# Patient Record
Sex: FEMALE | Race: WHITE | NOT HISPANIC OR LATINO
[De-identification: names, ages, dates, MRNs, and addresses within clinical notes are randomized per-mention and may not be internally consistent; named-entity substitution may affect disease eponyms.]

---

## 2017-11-20 PROBLEM — Z00.00 ENCOUNTER FOR PREVENTIVE HEALTH EXAMINATION: Status: ACTIVE | Noted: 2017-11-20

## 2017-12-01 ENCOUNTER — APPOINTMENT (OUTPATIENT)
Dept: OBGYN | Facility: CLINIC | Age: 42
End: 2017-12-01
Payer: COMMERCIAL

## 2017-12-01 VITALS
SYSTOLIC BLOOD PRESSURE: 132 MMHG | BODY MASS INDEX: 17.19 KG/M2 | WEIGHT: 107 LBS | DIASTOLIC BLOOD PRESSURE: 63 MMHG | HEIGHT: 66 IN | HEART RATE: 84 BPM

## 2017-12-01 DIAGNOSIS — Z80.3 FAMILY HISTORY OF MALIGNANT NEOPLASM OF BREAST: ICD-10-CM

## 2017-12-01 DIAGNOSIS — D47.3 ESSENTIAL (HEMORRHAGIC) THROMBOCYTHEMIA: ICD-10-CM

## 2017-12-01 DIAGNOSIS — Z80.41 FAMILY HISTORY OF MALIGNANT NEOPLASM OF OVARY: ICD-10-CM

## 2017-12-01 DIAGNOSIS — N84.0 POLYP OF CORPUS UTERI: ICD-10-CM

## 2017-12-01 DIAGNOSIS — F41.9 ANXIETY DISORDER, UNSPECIFIED: ICD-10-CM

## 2017-12-01 DIAGNOSIS — Z80.0 FAMILY HISTORY OF MALIGNANT NEOPLASM OF DIGESTIVE ORGANS: ICD-10-CM

## 2017-12-01 DIAGNOSIS — Z82.49 FAMILY HISTORY OF ISCHEMIC HEART DISEASE AND OTHER DISEASES OF THE CIRCULATORY SYSTEM: ICD-10-CM

## 2017-12-01 DIAGNOSIS — Z78.9 OTHER SPECIFIED HEALTH STATUS: ICD-10-CM

## 2017-12-01 PROCEDURE — 76857 US EXAM PELVIC LIMITED: CPT

## 2017-12-01 PROCEDURE — 99214 OFFICE O/P EST MOD 30 MIN: CPT

## 2017-12-02 LAB
BLOOD URINE: NORMAL
GLUCOSE QUALITATIVE U: NORMAL
LEUKOCYTE ESTERASE URINE: NORMAL
NITRITE URINE: NORMAL
PROTEIN URINE: NORMAL

## 2017-12-29 ENCOUNTER — APPOINTMENT (OUTPATIENT)
Dept: OBGYN | Facility: CLINIC | Age: 42
End: 2017-12-29
Payer: COMMERCIAL

## 2017-12-29 VITALS
WEIGHT: 107 LBS | HEIGHT: 66 IN | DIASTOLIC BLOOD PRESSURE: 59 MMHG | SYSTOLIC BLOOD PRESSURE: 121 MMHG | BODY MASS INDEX: 17.19 KG/M2 | HEART RATE: 94 BPM

## 2017-12-29 DIAGNOSIS — N63.20 UNSPECIFIED LUMP IN THE LEFT BREAST, UNSPECIFIED QUADRANT: ICD-10-CM

## 2017-12-29 PROCEDURE — 99214 OFFICE O/P EST MOD 30 MIN: CPT

## 2018-01-22 ENCOUNTER — APPOINTMENT (OUTPATIENT)
Dept: OBGYN | Facility: CLINIC | Age: 43
End: 2018-01-22
Payer: COMMERCIAL

## 2018-01-22 VITALS
WEIGHT: 107 LBS | DIASTOLIC BLOOD PRESSURE: 74 MMHG | BODY MASS INDEX: 17.19 KG/M2 | HEART RATE: 88 BPM | SYSTOLIC BLOOD PRESSURE: 129 MMHG | HEIGHT: 66 IN

## 2018-01-22 DIAGNOSIS — L29.0 PRURITUS ANI: ICD-10-CM

## 2018-01-22 LAB
BLOOD URINE: NORMAL
GLUCOSE QUALITATIVE U: NORMAL
KETONES URINE: NORMAL
LEUKOCYTE ESTERASE URINE: NORMAL
NITRITE URINE: NORMAL
PROTEIN URINE: NORMAL

## 2018-01-22 PROCEDURE — 99214 OFFICE O/P EST MOD 30 MIN: CPT

## 2018-01-23 ENCOUNTER — RX RENEWAL (OUTPATIENT)
Age: 43
End: 2018-01-23

## 2018-01-23 LAB
CANDIDA VAG CYTO: NOT DETECTED
FSH SERPL-MCNC: 2.1 IU/L
G VAGINALIS+PREV SP MTYP VAG QL MICRO: DETECTED
LH SERPL-ACNC: 3.8 IU/L
PROLACTIN SERPL-MCNC: 16 NG/ML
T VAGINALIS VAG QL WET PREP: NOT DETECTED
TSH SERPL-ACNC: 1.7 UIU/ML

## 2018-01-29 LAB — BACTERIA GENITAL AEROBE CULT: NORMAL

## 2018-02-16 ENCOUNTER — MESSAGE (OUTPATIENT)
Age: 43
End: 2018-02-16

## 2018-05-18 ENCOUNTER — APPOINTMENT (OUTPATIENT)
Dept: OBGYN | Facility: CLINIC | Age: 43
End: 2018-05-18
Payer: COMMERCIAL

## 2018-05-18 VITALS
HEART RATE: 80 BPM | DIASTOLIC BLOOD PRESSURE: 83 MMHG | BODY MASS INDEX: 17.52 KG/M2 | HEIGHT: 66 IN | SYSTOLIC BLOOD PRESSURE: 139 MMHG | WEIGHT: 109 LBS

## 2018-05-18 LAB
BILIRUBIN URINE: NORMAL
BILIRUBIN: NORMAL
GLUCOSE QUALITATIVE U: NORMAL
NITRITE, URINE: NORMAL
PH URINE: 5.5
PROTEIN URINE: NORMAL
SPECIFIC GRAVITY URINE: 1.01

## 2018-05-18 PROCEDURE — 99396 PREV VISIT EST AGE 40-64: CPT

## 2018-05-18 RX ORDER — ESCITALOPRAM OXALATE 5 MG/1
5 TABLET ORAL
Qty: 30 | Refills: 0 | Status: COMPLETED | COMMUNITY
Start: 2017-07-14

## 2018-05-18 RX ORDER — AZITHROMYCIN 250 MG/1
250 TABLET, FILM COATED ORAL
Qty: 12 | Refills: 0 | Status: COMPLETED | COMMUNITY
Start: 2017-10-12

## 2018-05-18 RX ORDER — DEXLANSOPRAZOLE 60 MG/1
60 CAPSULE, DELAYED RELEASE ORAL
Qty: 30 | Refills: 0 | Status: COMPLETED | COMMUNITY
Start: 2016-12-03

## 2018-05-18 RX ORDER — ASPIRIN 325 MG/1
TABLET, FILM COATED ORAL
Refills: 0 | Status: COMPLETED | COMMUNITY
End: 2018-05-18

## 2018-05-18 RX ORDER — HYDROXYUREA 500 MG/1
500 CAPSULE ORAL
Qty: 60 | Refills: 0 | Status: COMPLETED | COMMUNITY
Start: 2018-04-19

## 2018-05-18 RX ORDER — DEXLANSOPRAZOLE 30 MG/1
30 CAPSULE, DELAYED RELEASE ORAL
Refills: 0 | Status: COMPLETED | COMMUNITY
End: 2018-05-18

## 2018-05-18 RX ORDER — PROCHLORPERAZINE MALEATE 10 MG/1
10 TABLET ORAL
Qty: 20 | Refills: 0 | Status: COMPLETED | COMMUNITY
Start: 2018-03-09

## 2018-05-18 RX ORDER — ALPRAZOLAM 0.25 MG/1
0.25 TABLET ORAL
Qty: 30 | Refills: 0 | Status: COMPLETED | COMMUNITY
Start: 2017-11-27

## 2018-05-18 RX ORDER — ESCITALOPRAM OXALATE 10 MG/1
10 TABLET, FILM COATED ORAL
Refills: 0 | Status: COMPLETED | COMMUNITY
End: 2018-05-18

## 2018-05-18 RX ORDER — HYDROXYUREA 500 MG/1
CAPSULE ORAL
Refills: 0 | Status: COMPLETED | COMMUNITY
End: 2018-05-18

## 2018-05-18 RX ORDER — PREDNISONE 20 MG/1
20 TABLET ORAL
Qty: 12 | Refills: 0 | Status: COMPLETED | COMMUNITY
Start: 2017-10-12

## 2018-05-18 RX ORDER — CLINDAMYCIN PHOSPHATE 20 MG/G
2 CREAM VAGINAL
Qty: 3 | Refills: 1 | Status: COMPLETED | COMMUNITY
Start: 2018-01-23 | End: 2018-05-18

## 2018-05-19 LAB — HPV HIGH+LOW RISK DNA PNL CVX: NOT DETECTED

## 2018-05-23 LAB — CYTOLOGY CVX/VAG DOC THIN PREP: NORMAL

## 2018-07-13 ENCOUNTER — APPOINTMENT (OUTPATIENT)
Dept: OBGYN | Facility: CLINIC | Age: 43
End: 2018-07-13
Payer: COMMERCIAL

## 2018-07-13 VITALS
SYSTOLIC BLOOD PRESSURE: 124 MMHG | HEIGHT: 66 IN | HEART RATE: 75 BPM | BODY MASS INDEX: 17.52 KG/M2 | WEIGHT: 109 LBS | DIASTOLIC BLOOD PRESSURE: 70 MMHG

## 2018-07-13 PROCEDURE — 76856 US EXAM PELVIC COMPLETE: CPT

## 2018-07-13 PROCEDURE — 99214 OFFICE O/P EST MOD 30 MIN: CPT

## 2018-07-16 LAB
BACTERIA GENITAL AEROBE CULT: NORMAL
BLOOD URINE: NORMAL
CANDIDA VAG CYTO: NOT DETECTED
G VAGINALIS+PREV SP MTYP VAG QL MICRO: NOT DETECTED
GLUCOSE QUALITATIVE U: NORMAL
KETONES URINE: NORMAL
LEUKOCYTE ESTERASE URINE: NORMAL
NITRITE URINE: NORMAL
PROTEIN URINE: NORMAL
T VAGINALIS VAG QL WET PREP: NOT DETECTED

## 2018-07-27 ENCOUNTER — CLINICAL ADVICE (OUTPATIENT)
Age: 43
End: 2018-07-27

## 2018-07-27 PROBLEM — Z80.0 FAMILY HISTORY OF COLON CANCER: Status: ACTIVE | Noted: 2017-12-01

## 2018-12-14 ENCOUNTER — APPOINTMENT (OUTPATIENT)
Dept: OBGYN | Facility: CLINIC | Age: 43
End: 2018-12-14
Payer: COMMERCIAL

## 2018-12-14 VITALS
SYSTOLIC BLOOD PRESSURE: 120 MMHG | WEIGHT: 109 LBS | HEIGHT: 66 IN | HEART RATE: 71 BPM | BODY MASS INDEX: 17.52 KG/M2 | DIASTOLIC BLOOD PRESSURE: 80 MMHG

## 2018-12-14 DIAGNOSIS — N92.0 EXCESSIVE AND FREQUENT MENSTRUATION WITH REGULAR CYCLE: ICD-10-CM

## 2018-12-14 PROCEDURE — 76856 US EXAM PELVIC COMPLETE: CPT

## 2018-12-14 PROCEDURE — 99214 OFFICE O/P EST MOD 30 MIN: CPT

## 2019-05-24 ENCOUNTER — APPOINTMENT (OUTPATIENT)
Dept: OBGYN | Facility: CLINIC | Age: 44
End: 2019-05-24
Payer: COMMERCIAL

## 2019-05-24 VITALS
HEIGHT: 66 IN | BODY MASS INDEX: 17.52 KG/M2 | SYSTOLIC BLOOD PRESSURE: 120 MMHG | WEIGHT: 109 LBS | DIASTOLIC BLOOD PRESSURE: 90 MMHG | HEART RATE: 74 BPM

## 2019-05-24 PROCEDURE — 99214 OFFICE O/P EST MOD 30 MIN: CPT

## 2019-05-24 RX ORDER — TOBRAMYCIN AND DEXAMETHASONE 3; 1 MG/ML; MG/ML
0.3-0.1 SUSPENSION/ DROPS OPHTHALMIC
Qty: 5 | Refills: 0 | Status: COMPLETED | COMMUNITY
Start: 2018-12-24

## 2019-05-24 NOTE — HISTORY OF PRESENT ILLNESS
[Fever] : no fever [Nausea] : no nausea [Diarrhea] : no diarrhea [Vomiting] : no vomiting [Pelvic Pressure] : no pelvic pressure [Vaginal Bleeding] : no vaginal bleeding [Dysuria] : no dysuria [Burning] : burning [Itching] : itching [Stinging] : no stinging [Mass] : no mass [Lesion] : no lesion [Soreness] : no soreness [Discharge] : no discharge [Definite:  ___ (Date)] : the last menstrual period was [unfilled] [Normal Amount/Duration] : was of a normal amount and duration [Regular Cycle Intervals] : periods have been regular [Spotting Between  Menses] : no spotting between menses [Menstrual Cramps] : no menstrual cramps [Currently In Menopause] : not currently in menopause [On BCP at conception] : the patient was not on BCP at conception [Experiencing Menopausal Sxs] : not experiencing menopausal symptoms [Sexually Active] : is sexually active [Monogamous] : is monogamous [Male ___] : [unfilled] male [Contraception] : does not use contraception [NA] : N/A

## 2019-05-28 LAB
BACTERIA GENITAL AEROBE CULT: NORMAL
CANDIDA VAG CYTO: NOT DETECTED
G VAGINALIS+PREV SP MTYP VAG QL MICRO: NOT DETECTED
T VAGINALIS VAG QL WET PREP: NOT DETECTED

## 2019-06-28 ENCOUNTER — ASOB RESULT (OUTPATIENT)
Age: 44
End: 2019-06-28

## 2019-06-28 ENCOUNTER — APPOINTMENT (OUTPATIENT)
Dept: ANTEPARTUM | Facility: CLINIC | Age: 44
End: 2019-06-28
Payer: COMMERCIAL

## 2019-06-28 ENCOUNTER — APPOINTMENT (OUTPATIENT)
Dept: OBGYN | Facility: CLINIC | Age: 44
End: 2019-06-28
Payer: COMMERCIAL

## 2019-06-28 VITALS
HEART RATE: 71 BPM | DIASTOLIC BLOOD PRESSURE: 90 MMHG | WEIGHT: 110 LBS | SYSTOLIC BLOOD PRESSURE: 104 MMHG | HEIGHT: 66 IN | BODY MASS INDEX: 17.68 KG/M2

## 2019-06-28 DIAGNOSIS — E28.2 POLYCYSTIC OVARIAN SYNDROME: ICD-10-CM

## 2019-06-28 PROCEDURE — 76856 US EXAM PELVIC COMPLETE: CPT | Mod: 59

## 2019-06-28 PROCEDURE — 99396 PREV VISIT EST AGE 40-64: CPT

## 2019-06-28 PROCEDURE — 76830 TRANSVAGINAL US NON-OB: CPT

## 2019-06-28 RX ORDER — ASPIRIN 81 MG
81 TABLET, DELAYED RELEASE (ENTERIC COATED) ORAL
Refills: 0 | Status: ACTIVE | COMMUNITY

## 2019-06-28 RX ORDER — HYDROXYUREA 500 MG/1
CAPSULE ORAL
Refills: 0 | Status: ACTIVE | COMMUNITY

## 2019-06-28 RX ORDER — ESCITALOPRAM OXALATE 5 MG/1
TABLET, FILM COATED ORAL DAILY
Refills: 0 | Status: ACTIVE | COMMUNITY

## 2019-06-28 NOTE — PHYSICAL EXAM
[Awake] : awake [Alert] : alert [Examination Of The Breasts] : a normal appearance [No Masses] : no breast masses were palpable [Soft] : soft [Oriented x3] : oriented to person, place, and time [Normal] : uterus [No Bleeding] : there was no active vaginal bleeding [Uterine Adnexae] : were not tender and not enlarged [Acute Distress] : no acute distress [Mass] : no breast mass [Nipple Discharge] : no nipple discharge [Axillary LAD] : no axillary lymphadenopathy [Tender] : non tender

## 2019-06-28 NOTE — HISTORY OF PRESENT ILLNESS
[1 Year Ago] : 1 year ago [Good] : being in good health [Healthy Diet] : a healthy diet [Regular Exercise] : regular exercise [Current] : risk screening reviewed and current [Reproductive Age] : is of reproductive age [Pregnancy History] : pregnancy history: [HPV Vaccine NA Due to Age] : HPV vaccine not available to patient due to age [Prolonged Menses] : prolonged menses [Moderate Bleeding] : described as moderate in severity [Definite:  ___ (Date)] : the last menstrual period was [unfilled] [Regular Cycle Intervals] : periods have been regular [Sexually Active] : is sexually active [Monogamous] : is monogamous [Male ___] : [unfilled] male [NA] : N/A [Weight Concerns] : no concerns with her weight [Menstrual Problems] : reports normal menses [Up to Date] : not up to date with ~his/her~ STD screening [Fever] : no fever [Nausea] : no nausea [Vomiting] : no vomiting [Diarrhea] : no diarrhea [Vaginal Bleeding] : no vaginal bleeding [Pelvic Pressure] : no pelvic pressure [Dysuria] : no dysuria [Irregular Menses] : normal menses [FreeTextEntry9] : none [Hot Flashes] : no hot flashes [Night Sweats] : no night sweats [Vaginal Itching] : no vaginal itching [Dyspareunia] : no dyspareunia [Normal Amount/Duration] : was abnormal [Spotting Between  Menses] : no spotting between menses [Menstrual Cramps] : no menstrual cramps [Currently In Menopause] : not currently in menopause [Experiencing Menopausal Sxs] : not experiencing menopausal symptoms [Contraception] : does not use contraception

## 2019-07-01 LAB
FSH SERPL-MCNC: 8.7 IU/L
HPV HIGH+LOW RISK DNA PNL CVX: NOT DETECTED
LH SERPL-ACNC: 8.7 IU/L
PROLACTIN SERPL-MCNC: 7.7 NG/ML
TSH SERPL-ACNC: 1.52 UIU/ML

## 2019-07-05 LAB — CYTOLOGY CVX/VAG DOC THIN PREP: NORMAL

## 2020-01-10 ENCOUNTER — MESSAGE (OUTPATIENT)
Age: 45
End: 2020-01-10

## 2020-01-17 ENCOUNTER — APPOINTMENT (OUTPATIENT)
Dept: OBGYN | Facility: CLINIC | Age: 45
End: 2020-01-17

## 2020-01-17 ENCOUNTER — APPOINTMENT (OUTPATIENT)
Dept: ANTEPARTUM | Facility: CLINIC | Age: 45
End: 2020-01-17

## 2020-05-01 ENCOUNTER — APPOINTMENT (OUTPATIENT)
Dept: ANTEPARTUM | Facility: CLINIC | Age: 45
End: 2020-05-01

## 2020-05-01 ENCOUNTER — APPOINTMENT (OUTPATIENT)
Dept: OBGYN | Facility: CLINIC | Age: 45
End: 2020-05-01
Payer: COMMERCIAL

## 2020-05-01 VITALS — DIASTOLIC BLOOD PRESSURE: 80 MMHG | SYSTOLIC BLOOD PRESSURE: 120 MMHG | HEIGHT: 66 IN | HEART RATE: 104 BPM

## 2020-05-01 DIAGNOSIS — U07.1 COVID-19: ICD-10-CM

## 2020-05-01 PROCEDURE — 76856 US EXAM PELVIC COMPLETE: CPT

## 2020-05-01 PROCEDURE — 99214 OFFICE O/P EST MOD 30 MIN: CPT

## 2020-05-01 RX ORDER — CLINDAMYCIN PHOSPHATE 20 MG/G
2 CREAM VAGINAL
Qty: 40 | Refills: 1 | Status: DISCONTINUED | COMMUNITY
Start: 2020-04-13 | End: 2020-05-01

## 2020-05-01 RX ORDER — MOMETASONE FUROATE 1 MG/G
0.1 CREAM TOPICAL TWICE DAILY
Qty: 2 | Refills: 1 | Status: DISCONTINUED | COMMUNITY
Start: 2019-05-24 | End: 2020-05-01

## 2020-05-01 NOTE — PROCEDURE
[Abnormal Uterine Bleeding] : abnormal uterine bleeding [Pelvic Pain] : pelvic pain [Transvaginal Ultrasound] : transvaginal ultrasound [Present] : uterus present [No Fibroid(s)] : no fibroid(s) [L: ___ cm] : L: [unfilled] cm [H: ___ cm] : H: [unfilled] cm [W: ___cm] : W: [unfilled] cm [FreeTextEntry5] : EM 8,5 mm.  An echogenic area is noted at the fundus.  ? IUM [FreeTextEntry7] : 3.4 X 2.0 X 2.8 [FreeTextEntry8] : 2.9 X 1.0 [FreeTextEntry6] : No free fluid. [FreeTextEntry4] : Possible intrauterine mass.

## 2020-05-01 NOTE — HISTORY OF PRESENT ILLNESS
[Lower-Rt-Q] : lower right quadrant [Burning] : no burning [Dull] : dull [Continuous] : continuous [Sharp] : no sharp [Irregular Menses] : normal menses [Stabbing] : no stabbing [Moderate Bleeding] : described as moderate in severity [Prolonged Menses] : prolonged menses [Definite:  ___ (Date)] : the last menstrual period was [unfilled] [Normal Amount/Duration] : was abnormal [Spotting Between  Menses] : no spotting between menses [Currently In Menopause] : not currently in menopause [Regular Cycle Intervals] : periods have been regular [Menstrual Cramps] : no menstrual cramps [Experiencing Menopausal Sxs] : not experiencing menopausal symptoms [Sexually Active] : is sexually active [Monogamous] : is monogamous [Male ___] : [unfilled] male [Contraception] : does not use contraception [NA] : N/A

## 2020-05-02 LAB
FSH SERPL-MCNC: 6.9 IU/L
LH SERPL-ACNC: 12.5 IU/L
PROLACTIN SERPL-MCNC: 9.7 NG/ML
TSH SERPL-ACNC: 1 UIU/ML

## 2020-05-26 ENCOUNTER — APPOINTMENT (OUTPATIENT)
Dept: MATERNAL FETAL MEDICINE | Facility: CLINIC | Age: 45
End: 2020-05-26

## 2020-09-28 ENCOUNTER — APPOINTMENT (OUTPATIENT)
Dept: OBGYN | Facility: CLINIC | Age: 45
End: 2020-09-28

## 2020-09-28 ENCOUNTER — ASOB RESULT (OUTPATIENT)
Age: 45
End: 2020-09-28

## 2020-09-28 ENCOUNTER — APPOINTMENT (OUTPATIENT)
Dept: ANTEPARTUM | Facility: CLINIC | Age: 45
End: 2020-09-28
Payer: COMMERCIAL

## 2020-09-28 PROCEDURE — 58340 CATHETER FOR HYSTEROGRAPHY: CPT

## 2020-09-28 PROCEDURE — 76831 ECHO EXAM UTERUS: CPT

## 2020-10-02 LAB
FSH SERPL-MCNC: 3.5 IU/L
GLUCOSE BS SERPL-MCNC: 78 MG/DL
INSULIN SERPL-MCNC: 4 UU/ML
LH SERPL-ACNC: 5.3 IU/L
PROLACTIN SERPL-MCNC: 14.4 NG/ML
TSH SERPL-ACNC: 1.45 UIU/ML

## 2020-10-09 ENCOUNTER — APPOINTMENT (OUTPATIENT)
Dept: ANTEPARTUM | Facility: CLINIC | Age: 45
End: 2020-10-09

## 2020-10-09 ENCOUNTER — APPOINTMENT (OUTPATIENT)
Dept: OBGYN | Facility: CLINIC | Age: 45
End: 2020-10-09
Payer: COMMERCIAL

## 2020-10-09 VITALS
HEIGHT: 66 IN | WEIGHT: 108 LBS | DIASTOLIC BLOOD PRESSURE: 84 MMHG | SYSTOLIC BLOOD PRESSURE: 110 MMHG | BODY MASS INDEX: 17.36 KG/M2 | HEART RATE: 87 BPM

## 2020-10-09 DIAGNOSIS — Z20.9 CONTACT WITH AND (SUSPECTED) EXPOSURE TO UNSPECIFIED COMMUNICABLE DISEASE: ICD-10-CM

## 2020-10-09 PROCEDURE — 99396 PREV VISIT EST AGE 40-64: CPT

## 2020-10-09 NOTE — DISCUSSION/SUMMARY
[FreeTextEntry1] : routine check up.\par PAP.\par Mammo\par STD cultures and blood work.\par Await MRI results.\par \par Follow up in one year.

## 2020-10-09 NOTE — HISTORY OF PRESENT ILLNESS
[Patient reported mammogram was normal] : Patient reported mammogram was normal [Patient reported PAP Smear was normal] : Patient reported PAP Smear was normal [HIV test declined] : HIV test declined [Syphilis test declined] : Syphilis test declined [Gonorrhea test declined] : Gonorrhea test declined [Chlamydia test declined] : Chlamydia test declined [Trichomonas test declined] : Trichomonas test declined [HPV test declined] : HPV test declined [Hepatitis B test declined] : Hepatitis B test declined [Hepatitis C test declined] : Hepatitis C test declined [Irregular Menstrual Interval] : irregular menstrual interval [Abnormal Quantity] : abnormal quantity [Moderate Bleeding] : moderate bleeding [Pain] : pain [Normal Amount/Duration] :  normal amount and duration [Spotting Between  Menses] : spotting reported between menses [Regular Cycle Intervals] : periods have been regular [Menstrual Cramps] : menstrual cramps [Previously active] : previously active [Men] : men [Vaginal] : vaginal [No] : No [Patient refuses STI testing] : Patient refuses STI testing [Mammogramdate] : 11/19 [PapSmeardate] : 06/19 [TextBox_7] : none [TextBox_4] : none [TextBox_17] : none [FreeTextEntry1] : 10/01/20

## 2020-10-09 NOTE — REASON FOR VISIT
[Annual] : an annual visit. [Menorrhagia] : menorrhagia [Abnormal Uterine Bleeding] : abnormal uterine bleeding

## 2020-10-09 NOTE — PHYSICAL EXAM
[Appropriately responsive] : appropriately responsive [Alert] : alert [No Acute Distress] : no acute distress [No Lymphadenopathy] : no lymphadenopathy [Regular Rate Rhythm] : regular rate rhythm [No Murmurs] : no murmurs [Clear to Auscultation B/L] : clear to auscultation bilaterally [Soft] : soft [Non-tender] : non-tender [Non-distended] : non-distended [No HSM] : No HSM [No Lesions] : no lesions [No Mass] : no mass [Oriented x3] : oriented x3 [Examination Of The Breasts] : a normal appearance [No Masses] : no breast masses were palpable [Labia Majora] : normal [Labia Minora] : normal [Normal] : normal [Uterine Adnexae] : normal [FreeTextEntry5] : possible polyp.  stained with PAP.

## 2020-10-10 LAB
HBV SURFACE AB SER QL: REACTIVE
HBV SURFACE AG SER QL: NONREACTIVE
HCV AB SER QL: NONREACTIVE
HCV S/CO RATIO: 0.12 S/CO
HIV1+2 AB SPEC QL IA.RAPID: NONREACTIVE

## 2020-10-12 LAB
BACTERIA GENITAL AEROBE CULT: NORMAL
C TRACH RRNA SPEC QL NAA+PROBE: NOT DETECTED
CANDIDA VAG CYTO: NOT DETECTED
G VAGINALIS+PREV SP MTYP VAG QL MICRO: DETECTED
N GONORRHOEA RRNA SPEC QL NAA+PROBE: NOT DETECTED
RPR SER-TITR: NORMAL
SOURCE TP AMPLIFICATION: NORMAL
T VAGINALIS VAG QL WET PREP: NOT DETECTED

## 2020-10-14 LAB — HPV HIGH+LOW RISK DNA PNL CVX: NOT DETECTED

## 2020-10-15 LAB
HSV1 IGM SER QL: NORMAL TITER
HSV2 AB FLD-ACNC: NORMAL TITER

## 2020-10-16 LAB — CYTOLOGY CVX/VAG DOC THIN PREP: NORMAL

## 2020-10-23 ENCOUNTER — APPOINTMENT (OUTPATIENT)
Dept: ANTEPARTUM | Facility: CLINIC | Age: 45
End: 2020-10-23

## 2020-10-23 ENCOUNTER — LABORATORY RESULT (OUTPATIENT)
Age: 45
End: 2020-10-23

## 2020-10-23 ENCOUNTER — APPOINTMENT (OUTPATIENT)
Dept: OBGYN | Facility: CLINIC | Age: 45
End: 2020-10-23
Payer: COMMERCIAL

## 2020-10-23 VITALS — OXYGEN SATURATION: 98 % | HEART RATE: 93 BPM | RESPIRATION RATE: 16 BRPM

## 2020-10-23 LAB
BILIRUB UR QL STRIP: NORMAL
GLUCOSE UR-MCNC: NORMAL
HGB UR QL STRIP.AUTO: NORMAL
KETONES UR-MCNC: NORMAL
LEUKOCYTE ESTERASE UR QL STRIP: NORMAL
NITRITE UR QL STRIP: NORMAL
PH UR STRIP: 6.5
PROT UR STRIP-MCNC: NORMAL
SP GR UR STRIP: 1.02

## 2020-10-23 PROCEDURE — 99214 OFFICE O/P EST MOD 30 MIN: CPT

## 2020-10-23 PROCEDURE — 99072 ADDL SUPL MATRL&STAF TM PHE: CPT

## 2020-10-23 NOTE — HISTORY OF PRESENT ILLNESS
[Suprapubic] : suprapubic area [Continuous] : continuous [Dull] : dull [Sharp] : no sharp [Stabbing] : no stabbing [Throbbing] : no throbbing [Fever] : no fever [Nausea] : no nausea [Vomiting] : no vomiting [Burning] : burning [Itching] : no itching [Mass] : no mass [Stinging] : no stinging [Lesion] : no lesion [Soreness] : no soreness [Discharge] : discharge [Definite:  ___ (Date)] : the last menstrual period was [unfilled] [Normal Amount/Duration] : was abnormal [Spotting Between  Menses] : no spotting between menses [Regular Cycle Intervals] : periods have been regular [Menstrual Cramps] : no menstrual cramps [Currently In Menopause] : not currently in menopause [Experiencing Menopausal Sxs] : not experiencing menopausal symptoms [Sexually Active] : is not sexually active [Monogamous] : is monogamous [Contraception] : does not use contraception [Male ___] : [unfilled] male [NA] : N/A

## 2020-10-23 NOTE — PHYSICAL EXAM
[Normal] : uterus [Labia Majora] : labia major [Labia Minora] : labia minora [Erythema] : erythema [Discharge] : a  ~M vaginal discharge was present [Dry Mucosa] : moist mucosa [Erosion] : had erosions [Polyp ___ cm] : had no polyp [Dilated] : the cervix was not dilated [Soft] :  the cervix was not soft [Uterine Adnexae] : was normal on the right [___] : a [unfilled] ~Ucm mass was palpated in the left adnexa [No Tenderness] : no rectal tenderness

## 2020-10-26 LAB
BACTERIA UR CULT: NORMAL
CANDIDA VAG CYTO: NOT DETECTED
G VAGINALIS+PREV SP MTYP VAG QL MICRO: NOT DETECTED
T VAGINALIS VAG QL WET PREP: NOT DETECTED

## 2020-12-14 ENCOUNTER — APPOINTMENT (OUTPATIENT)
Dept: ANTEPARTUM | Facility: CLINIC | Age: 45
End: 2020-12-14

## 2020-12-14 ENCOUNTER — APPOINTMENT (OUTPATIENT)
Dept: OBGYN | Facility: CLINIC | Age: 45
End: 2020-12-14
Payer: COMMERCIAL

## 2020-12-14 VITALS — SYSTOLIC BLOOD PRESSURE: 110 MMHG | HEART RATE: 74 BPM | DIASTOLIC BLOOD PRESSURE: 80 MMHG | HEIGHT: 66 IN

## 2020-12-14 PROCEDURE — 99072 ADDL SUPL MATRL&STAF TM PHE: CPT

## 2020-12-14 PROCEDURE — 99214 OFFICE O/P EST MOD 30 MIN: CPT

## 2020-12-14 RX ORDER — CLINDAMYCIN PHOSPHATE 20 MG/G
2 CREAM VAGINAL
Qty: 1 | Refills: 1 | Status: DISCONTINUED | COMMUNITY
Start: 2020-10-12 | End: 2020-12-14

## 2020-12-14 NOTE — PHYSICAL EXAM
[Normal] : uterus [Discharge] : a  ~M vaginal discharge was present [Scant] : scant [Foul Smelling] : not foul smelling [White] : white [Thin] : thin [Mucoid] : mucoid [No Bleeding] : there was no active vaginal bleeding [Uterine Adnexae] : were not tender and not enlarged

## 2020-12-14 NOTE — HISTORY OF PRESENT ILLNESS
[Continuous] : continuous [Dull] : dull [Sharp] : no sharp [Throbbing] : no throbbing [Vaginal Discharge] : vaginal discharge [Burning] : burning [Itching] : itching [Mass] : no mass [Stinging] : no stinging [Lesion] : no lesion [Soreness] : no soreness [Discharge] : discharge [] : the vaginal discharge is described as [White] : white [Sexually Active] : is not sexually active [Monogamous] : is monogamous [Contraception] : does not use contraception [NA] : N/A

## 2020-12-16 LAB
CANDIDA VAG CYTO: NOT DETECTED
G VAGINALIS+PREV SP MTYP VAG QL MICRO: NOT DETECTED
T VAGINALIS VAG QL WET PREP: NOT DETECTED

## 2020-12-17 LAB — BACTERIA GENITAL AEROBE CULT: NORMAL

## 2021-02-19 ENCOUNTER — APPOINTMENT (OUTPATIENT)
Dept: OBGYN | Facility: CLINIC | Age: 46
End: 2021-02-19

## 2021-02-19 ENCOUNTER — APPOINTMENT (OUTPATIENT)
Dept: OBGYN | Facility: CLINIC | Age: 46
End: 2021-02-19
Payer: COMMERCIAL

## 2021-02-19 ENCOUNTER — APPOINTMENT (OUTPATIENT)
Dept: ANTEPARTUM | Facility: CLINIC | Age: 46
End: 2021-02-19

## 2021-02-19 VITALS — HEIGHT: 66 IN | DIASTOLIC BLOOD PRESSURE: 80 MMHG | SYSTOLIC BLOOD PRESSURE: 100 MMHG | HEART RATE: 94 BPM

## 2021-02-19 DIAGNOSIS — N83.201 UNSPECIFIED OVARIAN CYST, RIGHT SIDE: ICD-10-CM

## 2021-02-19 DIAGNOSIS — N94.9 UNSPECIFIED CONDITION ASSOCIATED WITH FEMALE GENITAL ORGANS AND MENSTRUAL CYCLE: ICD-10-CM

## 2021-02-19 PROCEDURE — 99214 OFFICE O/P EST MOD 30 MIN: CPT

## 2021-02-19 PROCEDURE — 99072 ADDL SUPL MATRL&STAF TM PHE: CPT

## 2021-02-19 NOTE — HISTORY OF PRESENT ILLNESS
[FreeTextEntry1] : Patient with intermittent staining for a month.\par History of vaginal infections.\par Not sexually active.\par Small amount of discharge.\par Dark in color.\par No odor.\par \par Pain on the right side recently. [Localized] : localized [Menses] : not menses [[place]] : not intercourse [Bowel Movement] : not bowel movement [TextBox_7] : right lower quadrant. [Chronic] : chronic [Urination] : urination [TextBox_4] : none [Abnormal Duration] : abnormal duration

## 2021-02-19 NOTE — DISCUSSION/SUMMARY
[FreeTextEntry1] : Vaginal cultures obtained.\par Await results and treat after.\par Patient reassured.\par All questions answered.

## 2021-02-22 LAB
BACTERIA GENITAL AEROBE CULT: ABNORMAL
CANDIDA VAG CYTO: NOT DETECTED
G VAGINALIS+PREV SP MTYP VAG QL MICRO: DETECTED
T VAGINALIS VAG QL WET PREP: NOT DETECTED

## 2021-02-25 ENCOUNTER — APPOINTMENT (OUTPATIENT)
Dept: UROGYNECOLOGY | Facility: CLINIC | Age: 46
End: 2021-02-25

## 2021-03-08 ENCOUNTER — APPOINTMENT (OUTPATIENT)
Dept: OBGYN | Facility: CLINIC | Age: 46
End: 2021-03-08
Payer: COMMERCIAL

## 2021-03-08 ENCOUNTER — APPOINTMENT (OUTPATIENT)
Dept: ANTEPARTUM | Facility: CLINIC | Age: 46
End: 2021-03-08

## 2021-03-08 VITALS — SYSTOLIC BLOOD PRESSURE: 132 MMHG | DIASTOLIC BLOOD PRESSURE: 90 MMHG | HEIGHT: 66 IN

## 2021-03-08 DIAGNOSIS — N64.4 MASTODYNIA: ICD-10-CM

## 2021-03-08 PROCEDURE — 99214 OFFICE O/P EST MOD 30 MIN: CPT

## 2021-03-08 PROCEDURE — 99072 ADDL SUPL MATRL&STAF TM PHE: CPT

## 2021-03-08 PROCEDURE — 76856 US EXAM PELVIC COMPLETE: CPT

## 2021-03-08 RX ORDER — CLINDAMYCIN PHOSPHATE 20 MG/G
2 CREAM VAGINAL
Qty: 40 | Refills: 1 | Status: DISCONTINUED | COMMUNITY
Start: 2021-02-22 | End: 2021-03-08

## 2021-03-08 NOTE — PHYSICAL EXAM
[Examination Of The Breasts] : a normal appearance [Breast Palpation Diffuse Fibrous Tissue Bilateral] : fibrocystic changes [No Masses] : no breast masses were palpable [Labia Majora] : normal [Labia Minora] : normal [Normal] : normal [Uterine Adnexae] : normal [FreeTextEntry5] : possible cervical erosion.  polyp.

## 2021-03-08 NOTE — HISTORY OF PRESENT ILLNESS
[Diffuse] : diffuse [Urination] : urination [Irregular Menstrual Interval] : irregular menstrual interval [Abnormal Duration] : abnormal duration [Staining] : staining [Localized Pain] : localized pain [Intermittent] : intermittent [Mild] : mild [FreeTextEntry1] : patient with recurrent vaginal infection.\par Also complains of light staining for a few weeks. \par Has used medication for vaginal infection.\par Unsure if resolved.\par Here for follow up cultures.\par Has an appointment with Dr. Mane this week. [Menses] : not menses [[place]] : not intercourse [Bowel Movement] : not bowel movement

## 2021-03-08 NOTE — PROCEDURE
[Abnormal Uterine Bleeding] : abnormal uterine bleeding [Transvaginal Ultrasound] : transvaginal ultrasound [Retroverted] : retroverted [No Fibroid(s)] : no fibroid(s) [L: ___ cm] : L: [unfilled] cm [W: ___cm] : W: [unfilled] cm [H: ___ cm] : H: [unfilled] cm [FreeTextEntry5] : EM 11.3mm [FreeTextEntry7] : 3.6 X 1.9 X 3.1 [FreeTextEntry8] : 3.2 X 1.6 X 2.3 [FreeTextEntry6] : No free fluid.+ [FreeTextEntry4] : Normal pelvic sonogram.

## 2021-03-08 NOTE — DISCUSSION/SUMMARY
[FreeTextEntry1] : repeat vaginal cultures.\par Await and treat.\par Normal pelvic sono.\par Has appt with Uro this week.\par Follow up after results.

## 2021-03-11 LAB — BACTERIA GENITAL AEROBE CULT: NORMAL

## 2021-03-19 ENCOUNTER — APPOINTMENT (OUTPATIENT)
Dept: ANTEPARTUM | Facility: CLINIC | Age: 46
End: 2021-03-19

## 2021-03-19 ENCOUNTER — APPOINTMENT (OUTPATIENT)
Dept: OBGYN | Facility: CLINIC | Age: 46
End: 2021-03-19
Payer: COMMERCIAL

## 2021-03-19 VITALS
HEIGHT: 66 IN | WEIGHT: 110 LBS | BODY MASS INDEX: 17.68 KG/M2 | DIASTOLIC BLOOD PRESSURE: 82 MMHG | SYSTOLIC BLOOD PRESSURE: 104 MMHG | HEART RATE: 87 BPM

## 2021-03-19 DIAGNOSIS — N89.8 OTHER SPECIFIED NONINFLAMMATORY DISORDERS OF VAGINA: ICD-10-CM

## 2021-03-19 PROCEDURE — 99072 ADDL SUPL MATRL&STAF TM PHE: CPT

## 2021-03-19 PROCEDURE — 99213 OFFICE O/P EST LOW 20 MIN: CPT

## 2021-03-19 NOTE — HISTORY OF PRESENT ILLNESS
[Acute] : an acute [Burning] : burning [Itching] : itching [Mass] : no mass [Soreness] : no soreness [Discharge] : no discharge [Vagina] : vagina [Vaginal Odor Foul] : foul vaginal odor [V/V Cream] : improved by V/V cream [Menses] : not worsened by menses [Urination] : not worsened by urinating [Heat] : not worsened by heat [Sweat] : not worsened by sweating [Diabetes Mellitus] : no diabetes mellitus [Crohn's Disease] : no Crohn's disease [Autoimmune Disease] : no autoimmune disease [Thyrotoxicosis] : no thyrotoxicosis with or without goiter [Sexually Active] : is not sexually active [Monogamous] : is monogamous [Contraception] : does not use contraception [NA] : N/A

## 2021-03-19 NOTE — PHYSICAL EXAM
[Normal] : uterus [Erythema] : erythema [Discharge] : a  ~M vaginal discharge was present [Scant] : scant [Foul Smelling] : foul smelling [[place]] : yellow [Thick] : thick [Mucoid] : mucoid [No Bleeding] : there was no active vaginal bleeding [Uterine Adnexae] : were not tender and not enlarged

## 2021-03-25 LAB — BACTERIA GENITAL AEROBE CULT: NORMAL

## 2021-06-08 ENCOUNTER — NON-APPOINTMENT (OUTPATIENT)
Age: 46
End: 2021-06-08

## 2021-06-11 ENCOUNTER — APPOINTMENT (OUTPATIENT)
Dept: OBGYN | Facility: CLINIC | Age: 46
End: 2021-06-11
Payer: COMMERCIAL

## 2021-06-11 ENCOUNTER — APPOINTMENT (OUTPATIENT)
Dept: ANTEPARTUM | Facility: CLINIC | Age: 46
End: 2021-06-11

## 2021-06-11 VITALS — HEIGHT: 66 IN | SYSTOLIC BLOOD PRESSURE: 110 MMHG | DIASTOLIC BLOOD PRESSURE: 76 MMHG | HEART RATE: 91 BPM

## 2021-06-11 PROCEDURE — 99072 ADDL SUPL MATRL&STAF TM PHE: CPT

## 2021-06-11 PROCEDURE — 99213 OFFICE O/P EST LOW 20 MIN: CPT

## 2021-06-11 NOTE — HISTORY OF PRESENT ILLNESS
[Burning] : no burning [Itching] : no itching [Mass] : no mass [Stinging] : no stinging [Lesion] : no lesion [Soreness] : no soreness [Discharge] : no discharge [Irregular Menses] : normal menses [Prolonged Menses] : menses of normal duration [Light Bleeding] : described as light in severity [Bleeding] : bleeding [Pain] : no pelvic pain [Dizziness] : no dizziness [Palpitations] : no palpitations [Menopausal Symptoms] : no manopausal symptoms [Pregnancy] : no pregnancy [Definite:  ___ (Date)] : the last menstrual period was [unfilled] [Normal Amount/Duration] : was abnormal [Spotting Between  Menses] : no spotting between menses [Regular Cycle Intervals] : periods have been regular [Menstrual Cramps] : no menstrual cramps [On BCP at conception] : the patient was not on BCP at conception [Currently In Menopause] : not currently in menopause [Experiencing Menopausal Sxs] : not experiencing menopausal symptoms [Sexually Active] : is not sexually active [Monogamous] : is monogamous [Contraception] : does not use contraception [NA] : N/A

## 2021-07-02 ENCOUNTER — APPOINTMENT (OUTPATIENT)
Dept: OBGYN | Facility: CLINIC | Age: 46
End: 2021-07-02
Payer: COMMERCIAL

## 2021-07-02 ENCOUNTER — APPOINTMENT (OUTPATIENT)
Dept: ANTEPARTUM | Facility: CLINIC | Age: 46
End: 2021-07-02

## 2021-07-02 VITALS — DIASTOLIC BLOOD PRESSURE: 80 MMHG | HEIGHT: 66 IN | HEART RATE: 81 BPM | SYSTOLIC BLOOD PRESSURE: 100 MMHG

## 2021-07-02 PROCEDURE — 99214 OFFICE O/P EST MOD 30 MIN: CPT

## 2021-07-02 PROCEDURE — 99072 ADDL SUPL MATRL&STAF TM PHE: CPT

## 2021-07-02 RX ORDER — CLINDAMYCIN PHOSPHATE 20 MG/G
2 CREAM VAGINAL
Qty: 40 | Refills: 1 | Status: DISCONTINUED | COMMUNITY
Start: 2021-06-14 | End: 2021-07-02

## 2021-07-02 NOTE — HISTORY OF PRESENT ILLNESS
[Burning] : burning [Itching] : itching [Mass] : no mass [Stinging] : no stinging [Lesion] : no lesion [Soreness] : soreness [] : the vaginal discharge is described as [Mucous] : mucousy [White] : white [Definite:  ___ (Date)] : the last menstrual period was [unfilled] [Normal Amount/Duration] : was of a normal amount and duration [Spotting Between  Menses] : no spotting between menses [Regular Cycle Intervals] : periods have been regular [Menstrual Cramps] : no menstrual cramps [Currently In Menopause] : not currently in menopause [Experiencing Menopausal Sxs] : not experiencing menopausal symptoms [Sexually Active] : is not sexually active [Monogamous] : is monogamous [Contraception] : does not use contraception [NA] : N/A

## 2021-07-02 NOTE — PHYSICAL EXAM
[Normal] : uterus [No Bleeding] : there was no active vaginal bleeding [Tenderness] : tender [Mass ___ cm] : a [unfilled] ~Ucm uterine mass was palpated [Uterine Adnexae] : was normal on the right [___] : a [unfilled] ~Ucm mass was palpated in the left adnexa

## 2021-07-26 ENCOUNTER — APPOINTMENT (OUTPATIENT)
Dept: MATERNAL FETAL MEDICINE | Facility: CLINIC | Age: 46
End: 2021-07-26

## 2021-07-26 ENCOUNTER — APPOINTMENT (OUTPATIENT)
Dept: ANTEPARTUM | Facility: CLINIC | Age: 46
End: 2021-07-26

## 2021-09-23 ENCOUNTER — RX RENEWAL (OUTPATIENT)
Age: 46
End: 2021-09-23

## 2021-11-05 ENCOUNTER — APPOINTMENT (OUTPATIENT)
Dept: ANTEPARTUM | Facility: CLINIC | Age: 46
End: 2021-11-05

## 2021-11-05 ENCOUNTER — APPOINTMENT (OUTPATIENT)
Dept: MATERNAL FETAL MEDICINE | Facility: CLINIC | Age: 46
End: 2021-11-05
Payer: COMMERCIAL

## 2021-11-05 VITALS — HEIGHT: 66 IN | SYSTOLIC BLOOD PRESSURE: 103 MMHG | HEART RATE: 73 BPM | DIASTOLIC BLOOD PRESSURE: 70 MMHG

## 2021-11-05 DIAGNOSIS — Z87.19 PERSONAL HISTORY OF OTHER DISEASES OF THE DIGESTIVE SYSTEM: ICD-10-CM

## 2021-11-05 DIAGNOSIS — M54.50 LOW BACK PAIN, UNSPECIFIED: ICD-10-CM

## 2021-11-05 PROCEDURE — 99213 OFFICE O/P EST LOW 20 MIN: CPT

## 2021-11-05 RX ORDER — DEXLANSOPRAZOLE 60 MG/1
CAPSULE, DELAYED RELEASE ORAL
Refills: 0 | Status: ACTIVE | COMMUNITY

## 2021-11-05 RX ORDER — MOMETASONE FUROATE 1 MG/G
0.1 CREAM TOPICAL TWICE DAILY
Qty: 90 | Refills: 1 | Status: DISCONTINUED | COMMUNITY
Start: 2020-10-23 | End: 2021-11-05

## 2021-11-05 NOTE — PHYSICAL EXAM
[Chaperone Declined] : Patient declined chaperone [Normal] : uterus [Discharge] : a  ~M vaginal discharge was present [No Bleeding] : there was no active vaginal bleeding [Uterine Adnexae] : were not tender and not enlarged

## 2021-11-05 NOTE — CHIEF COMPLAINT
[Urgent Visit] : Urgent Visit [FreeTextEntry1] : vaginal discomfort and discharge for 1 week.\par No other complaints.

## 2021-11-10 LAB — BACTERIA GENITAL AEROBE CULT: NORMAL

## 2022-01-28 ENCOUNTER — APPOINTMENT (OUTPATIENT)
Dept: MATERNAL FETAL MEDICINE | Facility: CLINIC | Age: 47
End: 2022-01-28
Payer: COMMERCIAL

## 2022-01-28 ENCOUNTER — APPOINTMENT (OUTPATIENT)
Dept: ANTEPARTUM | Facility: CLINIC | Age: 47
End: 2022-01-28

## 2022-01-28 VITALS
WEIGHT: 110 LBS | BODY MASS INDEX: 17.68 KG/M2 | HEART RATE: 91 BPM | HEIGHT: 66 IN | SYSTOLIC BLOOD PRESSURE: 112 MMHG | DIASTOLIC BLOOD PRESSURE: 70 MMHG

## 2022-01-28 PROCEDURE — 99396 PREV VISIT EST AGE 40-64: CPT

## 2022-01-28 NOTE — HISTORY OF PRESENT ILLNESS
[Patient reported mammogram was normal] : Patient reported mammogram was normal [Patient reported PAP Smear was normal] : Patient reported PAP Smear was normal [HIV test declined] : HIV test declined [Syphilis test declined] : Syphilis test declined [Gonorrhea test declined] : Gonorrhea test declined [Chlamydia test declined] : Chlamydia test declined [Trichomonas test declined] : Trichomonas test declined [HPV test offered] : HPV test offered [Hepatitis B test declined] : Hepatitis B test declined [Hepatitis C test declined] : Hepatitis C test declined [N] : Patient is not sexually active [Y] : Positive pregnancy history [Diffuse] : diffuse [Chronic] : chronic [Urination] : urination [Irregular Menstrual Interval] : irregular menstrual interval [Abnormal Duration] : abnormal duration [Staining] : staining [Localized Pain] : localized pain [Intermittent] : intermittent [Mild] : mild [Normal Amount/Duration] :  normal amount and duration [Spotting Between  Menses] : spotting reported between menses [Regular Cycle Intervals] : periods have been regular [Menstrual Cramps] : menstrual cramps [Previously active] : previously active [Men] : men [Vaginal] : vaginal [No] : No [Patient refuses STI testing] : Patient refuses STI testing [Mammogramdate] : 01/21 [BreastSonogramDate] : 01/21 [PapSmeardate] : 09/20 [LMPDate] : 01/05/22 [MensesFreq] : 28 [PGHxTotal] : 3 [[facility] 2 [[facility] : 2 [PGHxABSpont] : 1 [Menses] : not menses [[place]] : not intercourse [Bowel Movement] : not bowel movement [TextBox_4] : none [FreeTextEntry1] : 01/05/22

## 2022-01-28 NOTE — DISCUSSION/SUMMARY
[FreeTextEntry1] : PAP.\par Mammo/Sono.\par Vaginal cultures done.\par Cervix friable with erosion/polyp.\par PAP's have been normal.\par Treat after culture results available.\par No other complaints.\par Routine follow up.\par All questions answered.

## 2022-01-28 NOTE — PHYSICAL EXAM
[Chaperone Declined] : Patient declined chaperone [Appropriately responsive] : appropriately responsive [Alert] : alert [No Acute Distress] : no acute distress [No Lymphadenopathy] : no lymphadenopathy [Regular Rate Rhythm] : regular rate rhythm [No Murmurs] : no murmurs [Clear to Auscultation B/L] : clear to auscultation bilaterally [Soft] : soft [Non-tender] : non-tender [Non-distended] : non-distended [No HSM] : No HSM [No Lesions] : no lesions [No Mass] : no mass [Oriented x3] : oriented x3 [Examination Of The Breasts] : a normal appearance [Breast Palpation Diffuse Fibrous Tissue Bilateral] : fibrocystic changes [No Masses] : no breast masses were palpable [Labia Majora] : normal [Labia Minora] : normal [Erosion] : erosions [Normal] : normal [Uterine Adnexae] : normal [FreeTextEntry5] : possible cervical erosion.  polyp.

## 2022-02-01 LAB
BACTERIA GENITAL AEROBE CULT: NORMAL
CANDIDA VAG CYTO: NOT DETECTED
G VAGINALIS+PREV SP MTYP VAG QL MICRO: DETECTED
HPV HIGH+LOW RISK DNA PNL CVX: NOT DETECTED
T VAGINALIS VAG QL WET PREP: NOT DETECTED

## 2022-02-02 LAB — CYTOLOGY CVX/VAG DOC THIN PREP: NORMAL

## 2022-02-03 ENCOUNTER — ASOB RESULT (OUTPATIENT)
Age: 47
End: 2022-02-03

## 2022-02-03 ENCOUNTER — APPOINTMENT (OUTPATIENT)
Dept: MATERNAL FETAL MEDICINE | Facility: CLINIC | Age: 47
End: 2022-02-03
Payer: COMMERCIAL

## 2022-02-03 PROCEDURE — 99202 OFFICE O/P NEW SF 15 MIN: CPT | Mod: 95

## 2022-02-14 ENCOUNTER — APPOINTMENT (OUTPATIENT)
Dept: ANTEPARTUM | Facility: CLINIC | Age: 47
End: 2022-02-14

## 2022-02-14 ENCOUNTER — APPOINTMENT (OUTPATIENT)
Dept: MATERNAL FETAL MEDICINE | Facility: CLINIC | Age: 47
End: 2022-02-14
Payer: COMMERCIAL

## 2022-02-14 VITALS — HEART RATE: 87 BPM | SYSTOLIC BLOOD PRESSURE: 110 MMHG | DIASTOLIC BLOOD PRESSURE: 80 MMHG | HEIGHT: 66 IN

## 2022-02-14 DIAGNOSIS — Z87.898 PERSONAL HISTORY OF OTHER SPECIFIED CONDITIONS: ICD-10-CM

## 2022-02-14 PROCEDURE — 99214 OFFICE O/P EST MOD 30 MIN: CPT

## 2022-02-14 NOTE — HISTORY OF PRESENT ILLNESS
[1 Year Ago] : 1 year ago [Good] : being in good health [Healthy Diet] : a healthy diet [Regular Exercise] : regular exercise [Weight Concerns] : no concerns with her weight [Current] : risk screening reviewed and current [Reproductive Age] : is of reproductive age [Menstrual Problems] : reports normal menses [Pregnancy History] : pregnancy history: [Up to Date] : not up to date with ~his/her~ STD screening [HPV Vaccine NA Due to Age] : HPV vaccine not available to patient due to age [Continuous] : continuous [Dull] : dull [Sharp] : no sharp [Throbbing] : no throbbing [Vaginal Discharge] : vaginal discharge [Burning] : burning [Itching] : itching [Mass] : no mass [Stinging] : no stinging [Lesion] : no lesion [Soreness] : soreness [] : the vaginal discharge is described as [Mucous] : mucousy [White] : white [Irregular Menses] : normal menses [Prolonged Menses] : menses of normal duration [Light Bleeding] : described as light in severity [Bleeding] : bleeding [Pain] : no pelvic pain [Dizziness] : no dizziness [Palpitations] : no palpitations [Menopausal Symptoms] : no manopausal symptoms [Pregnancy] : no pregnancy [FreeTextEntry9] : none [Hot Flashes] : no hot flashes [Night Sweats] : no night sweats [Vaginal Itching] : no vaginal itching [Dyspareunia] : no dyspareunia [Definite:  ___ (Date)] : the last menstrual period was [unfilled] [Normal Amount/Duration] : was of a normal amount and duration [Spotting Between  Menses] : no spotting between menses [Regular Cycle Intervals] : periods have been regular [Menstrual Cramps] : no menstrual cramps [Currently In Menopause] : not currently in menopause [Experiencing Menopausal Sxs] : not experiencing menopausal symptoms [Sexually Active] : is not sexually active [Monogamous] : is monogamous [Contraception] : does not use contraception [NA] : N/A

## 2022-02-14 NOTE — CHIEF COMPLAINT
[Urgent Visit] : Urgent Visit [FreeTextEntry1] : follow up cultures for recurrent vaginal infection.\par S/P treatment.

## 2022-02-14 NOTE — PHYSICAL EXAM
[Chaperone Declined] : Patient declined chaperone [Normal] : uterus [No Bleeding] : there was no active vaginal bleeding [Uterine Adnexae] : were not tender and not enlarged [FreeTextEntry5] : vascular with mucoid like polyp seen previously.

## 2022-02-22 LAB — BACTERIA GENITAL AEROBE CULT: NORMAL

## 2022-03-10 ENCOUNTER — APPOINTMENT (OUTPATIENT)
Dept: MATERNAL FETAL MEDICINE | Facility: CLINIC | Age: 47
End: 2022-03-10
Payer: COMMERCIAL

## 2022-03-10 ENCOUNTER — ASOB RESULT (OUTPATIENT)
Age: 47
End: 2022-03-10

## 2022-03-10 PROCEDURE — 99202 OFFICE O/P NEW SF 15 MIN: CPT | Mod: 95

## 2022-04-01 ENCOUNTER — APPOINTMENT (OUTPATIENT)
Dept: ANTEPARTUM | Facility: CLINIC | Age: 47
End: 2022-04-01

## 2022-04-01 ENCOUNTER — APPOINTMENT (OUTPATIENT)
Dept: MATERNAL FETAL MEDICINE | Facility: CLINIC | Age: 47
End: 2022-04-01
Payer: COMMERCIAL

## 2022-04-01 VITALS — HEIGHT: 66 IN | HEART RATE: 92 BPM | SYSTOLIC BLOOD PRESSURE: 120 MMHG | DIASTOLIC BLOOD PRESSURE: 83 MMHG

## 2022-04-01 PROCEDURE — 99213 OFFICE O/P EST LOW 20 MIN: CPT

## 2022-04-01 RX ORDER — CLINDAMYCIN PHOSPHATE 20 MG/G
2 CREAM VAGINAL
Qty: 40 | Refills: 1 | Status: DISCONTINUED | COMMUNITY
Start: 2022-02-01 | End: 2022-04-01

## 2022-04-01 NOTE — PHYSICAL EXAM
[Chaperone Declined] : Patient declined chaperone [Normal] : uterus [Atrophy] : no atrophy [Cystocele] : no cystocele [Scant] : scant [Foul Smelling] : not foul smelling [White] : white [Thick] : thick [No Bleeding] : there was no active vaginal bleeding [Discharge] : had no discharge [Barrel-Shaped] : was not barrel-shaped [Erosion] : had erosions [Bluish Discoloration] : did not have a bluish discoloration [Polyp ___ cm] : had no polyp [Mass ___ cm] : had no mass [Open ___ cm] : was closed [Tissue] : did not have tissue in it [Nabothian Cyst ___ cm] : had no nabothian cyst [Pap Obtained] : a Pap smear was not performed [IUD String] : did not have an IUD string protruding out [Dilated] : the cervix was not dilated [Motion Tenderness] : there was no cervical motion tenderness [Soft] :  the cervix was soft [Uterine Adnexae] : were not tender and not enlarged [FreeTextEntry5] : Cervix is hypervascular.  Spots with tactile stimulus.

## 2022-04-01 NOTE — HISTORY OF PRESENT ILLNESS
[Lower-Rt-Q] : lower right quadrant [Continuous] : no continuous [Dull] : dull [Sharp] : no sharp [Stabbing] : no stabbing [Acute] : an acute [Chronic] : a non-chronic [Localized] : a non-localized [Burning] : no burning [Itching] : no itching [Mass] : no mass [Stinging] : stinging [Lesion] : no lesion [Soreness] : no soreness [] : the vaginal discharge is described as [White] : white [Vaginal Odor Foul] : vaginal odor not foul [Skin Color Change] : no skin color change [Genital Wart] : no genital wart [Skin Growth] : no skin growth(s) [Skin Ulcerations] : no skin ulceration(s) [Sexually Active] : is not sexually active [Monogamous] : is monogamous [Contraception] : does not use contraception [NA] : N/A

## 2022-04-04 LAB
CANDIDA VAG CYTO: NOT DETECTED
G VAGINALIS+PREV SP MTYP VAG QL MICRO: DETECTED
T VAGINALIS VAG QL WET PREP: NOT DETECTED

## 2022-04-05 LAB — BACTERIA GENITAL AEROBE CULT: NORMAL

## 2022-04-15 ENCOUNTER — APPOINTMENT (OUTPATIENT)
Dept: ANTEPARTUM | Facility: CLINIC | Age: 47
End: 2022-04-15

## 2022-04-15 ENCOUNTER — APPOINTMENT (OUTPATIENT)
Dept: MATERNAL FETAL MEDICINE | Facility: CLINIC | Age: 47
End: 2022-04-15
Payer: COMMERCIAL

## 2022-04-15 VITALS — SYSTOLIC BLOOD PRESSURE: 110 MMHG | HEART RATE: 88 BPM | DIASTOLIC BLOOD PRESSURE: 70 MMHG

## 2022-04-15 PROCEDURE — 99213 OFFICE O/P EST LOW 20 MIN: CPT

## 2022-04-15 NOTE — HISTORY OF PRESENT ILLNESS
[FreeTextEntry1] : Patient is here for follow up cultures for EMILY. [Vagina] : vagina [Chronic] : chronic [Previously active] : previously active [Men] : men [Vaginal] : vaginal [No] : No [Patient refuses STI testing] : Patient refuses STI testing

## 2022-04-15 NOTE — DISCUSSION/SUMMARY
[FreeTextEntry1] : Repeat cultures obtained\par await results and treat if needed.\par All questions answered.

## 2022-04-15 NOTE — PHYSICAL EXAM
[Chaperone Declined] : Patient declined chaperone [Labia Majora] : normal [Labia Minora] : normal [Discharge] : a  ~M vaginal discharge was present [Scant] : scant [Foul Smelling] : not foul smelling [Clear] : clear [Mucoid] : mucoid [Erosion] : erosions [Normal] : normal [Uterine Adnexae] : normal [FreeTextEntry5] : possible cervical erosion.  polyp.

## 2022-04-18 LAB
BACTERIA GENITAL AEROBE CULT: NORMAL
CANDIDA VAG CYTO: NOT DETECTED
G VAGINALIS+PREV SP MTYP VAG QL MICRO: DETECTED
T VAGINALIS VAG QL WET PREP: NOT DETECTED

## 2022-04-29 ENCOUNTER — APPOINTMENT (OUTPATIENT)
Dept: MATERNAL FETAL MEDICINE | Facility: CLINIC | Age: 47
End: 2022-04-29
Payer: COMMERCIAL

## 2022-04-29 ENCOUNTER — APPOINTMENT (OUTPATIENT)
Dept: ANTEPARTUM | Facility: CLINIC | Age: 47
End: 2022-04-29

## 2022-04-29 PROCEDURE — 99213 OFFICE O/P EST LOW 20 MIN: CPT

## 2022-04-29 NOTE — HISTORY OF PRESENT ILLNESS
[Acute] : an acute [Generlized] : a generalized [Burning] : burning [Itching] : itching [Mass] : no mass [Stinging] : no stinging [Lesion] : no lesion [Soreness] : soreness [Discharge] : no discharge [Vagina] : vagina [Sexually Active] : is not sexually active [Monogamous] : is monogamous [Contraception] : does not use contraception [NA] : N/A

## 2022-05-27 ENCOUNTER — APPOINTMENT (OUTPATIENT)
Dept: MATERNAL FETAL MEDICINE | Facility: CLINIC | Age: 47
End: 2022-05-27
Payer: COMMERCIAL

## 2022-05-27 ENCOUNTER — APPOINTMENT (OUTPATIENT)
Dept: ANTEPARTUM | Facility: CLINIC | Age: 47
End: 2022-05-27

## 2022-05-27 VITALS — SYSTOLIC BLOOD PRESSURE: 110 MMHG | HEART RATE: 86 BPM | DIASTOLIC BLOOD PRESSURE: 80 MMHG

## 2022-05-27 PROCEDURE — 76856 US EXAM PELVIC COMPLETE: CPT

## 2022-05-27 PROCEDURE — 99214 OFFICE O/P EST MOD 30 MIN: CPT

## 2022-05-27 RX ORDER — CLINDAMYCIN PHOSPHATE 20 MG/G
2 CREAM VAGINAL
Qty: 40 | Refills: 1 | Status: DISCONTINUED | COMMUNITY
Start: 2022-04-18 | End: 2022-05-27

## 2022-05-27 RX ORDER — METRONIDAZOLE 500 MG/1
500 TABLET ORAL TWICE DAILY
Qty: 10 | Refills: 0 | Status: DISCONTINUED | COMMUNITY
Start: 2022-04-04 | End: 2022-05-27

## 2022-05-27 RX ORDER — FLUCONAZOLE 150 MG/1
150 TABLET ORAL
Qty: 2 | Refills: 1 | Status: DISCONTINUED | COMMUNITY
Start: 2022-04-29 | End: 2022-05-27

## 2022-05-27 NOTE — HISTORY OF PRESENT ILLNESS
[Prolonged Menses] : menses of normal duration [Undefiniable] : undefinable [Staining] : described as blood tinged staining of undergarments [Pain] : no pelvic pain [Dizziness] : no dizziness [Palpitations] : no palpitations [Menopausal Symptoms] : no manopausal symptoms [Pregnancy] : no pregnancy [Definite:  ___ (Date)] : the last menstrual period was [unfilled] [Normal Amount/Duration] : was of a normal amount and duration [Spotting Between  Menses] : spotting reported between menses [Regular Cycle Intervals] : periods have been regular [Menstrual Cramps] : no menstrual cramps [On BCP at conception] : the patient was not on BCP at conception [Currently In Menopause] : not currently in menopause [Sexually Active] : is not sexually active [Monogamous] : is monogamous [Contraception] : does not use contraception [NA] : N/A

## 2022-05-27 NOTE — PROCEDURE
[Abnormal Uterine Bleeding] : abnormal uterine bleeding [Transvaginal Ultrasound] : transvaginal ultrasound [Anteverted] : anteverted [No Fibroid(s)] : no fibroid(s) [L: ___ cm] : L: [unfilled] cm [W: ___cm] : W: [unfilled] cm [H: ___ cm] : H: [unfilled] cm [FreeTextEntry5] : RM 10.8 mm [FreeTextEntry7] : 2.3 X 1.9 X 2.8  CM [FreeTextEntry8] : 1.8 X 1.5 X 2.2 CM [FreeTextEntry4] : Normal pelvic ultrasound.

## 2022-06-17 ENCOUNTER — APPOINTMENT (OUTPATIENT)
Dept: MATERNAL FETAL MEDICINE | Facility: CLINIC | Age: 47
End: 2022-06-17
Payer: COMMERCIAL

## 2022-06-17 ENCOUNTER — APPOINTMENT (OUTPATIENT)
Dept: ANTEPARTUM | Facility: CLINIC | Age: 47
End: 2022-06-17

## 2022-06-17 VITALS — DIASTOLIC BLOOD PRESSURE: 80 MMHG | SYSTOLIC BLOOD PRESSURE: 100 MMHG | HEART RATE: 92 BPM

## 2022-06-17 DIAGNOSIS — N30.90 CYSTITIS, UNSPECIFIED W/OUT HEMATURIA: ICD-10-CM

## 2022-06-17 PROCEDURE — 99213 OFFICE O/P EST LOW 20 MIN: CPT

## 2022-06-17 RX ORDER — ONDANSETRON 8 MG/1
8 TABLET ORAL
Qty: 15 | Refills: 0 | Status: ACTIVE | COMMUNITY
Start: 2022-06-14

## 2022-06-17 RX ORDER — PREDNISONE 10 MG/1
10 TABLET ORAL
Qty: 12 | Refills: 0 | Status: ACTIVE | COMMUNITY
Start: 2022-04-26

## 2022-06-17 RX ORDER — TRIAMCINOLONE ACETONIDE 1 MG/G
0.1 CREAM TOPICAL
Qty: 30 | Refills: 0 | Status: ACTIVE | COMMUNITY
Start: 2021-12-16

## 2022-06-17 RX ORDER — FLUTICASONE PROPIONATE 50 UG/1
50 SPRAY, METERED NASAL
Qty: 16 | Refills: 0 | Status: ACTIVE | COMMUNITY
Start: 2022-01-28

## 2022-06-17 NOTE — HISTORY OF PRESENT ILLNESS
[Burning] : burning [Itching] : itching [Mass] : no mass [Stinging] : no stinging [Lesion] : no lesion [Soreness] : soreness [Suprapubic] : suprapubic area [Vagina] : vagina [Sexually Active] : is not sexually active [Monogamous] : is monogamous [Contraception] : does not use contraception [NA] : N/A

## 2022-09-19 ENCOUNTER — APPOINTMENT (OUTPATIENT)
Dept: ANTEPARTUM | Facility: CLINIC | Age: 47
End: 2022-09-19

## 2022-09-19 ENCOUNTER — APPOINTMENT (OUTPATIENT)
Dept: MATERNAL FETAL MEDICINE | Facility: CLINIC | Age: 47
End: 2022-09-19

## 2022-09-19 VITALS — HEART RATE: 76 BPM | DIASTOLIC BLOOD PRESSURE: 80 MMHG | HEIGHT: 66 IN | SYSTOLIC BLOOD PRESSURE: 100 MMHG

## 2022-09-19 DIAGNOSIS — N89.8 OTHER SPECIFIED NONINFLAMMATORY DISORDERS OF VAGINA: ICD-10-CM

## 2022-09-19 PROCEDURE — 99213 OFFICE O/P EST LOW 20 MIN: CPT

## 2022-09-19 NOTE — PHYSICAL EXAM
[Chaperone Declined] : Patient declined chaperone [Normal] : uterus [Scant] : scant [White] : white [Thick] : thick [No Bleeding] : there was no active vaginal bleeding [Erosion] : had erosions [Soft] :  the cervix was soft [Uterine Adnexae] : were not tender and not enlarged [Atrophy] : no atrophy [Cystocele] : no cystocele [Foul Smelling] : not foul smelling [Discharge] : had no discharge [Barrel-Shaped] : was not barrel-shaped [Bluish Discoloration] : did not have a bluish discoloration [Polyp ___ cm] : had no polyp [Mass ___ cm] : had no mass [Open ___ cm] : was closed [Tissue] : did not have tissue in it [Nabothian Cyst ___ cm] : had no nabothian cyst [Pap Obtained] : a Pap smear was not performed [IUD String] : did not have an IUD string protruding out [Dilated] : the cervix was not dilated [Motion Tenderness] : there was no cervical motion tenderness [FreeTextEntry5] : Cervix is hypervascular.  Spots with tactile stimulus.

## 2022-09-19 NOTE — HISTORY OF PRESENT ILLNESS
[1 Year Ago] : 1 year ago [Good] : being in good health [Healthy Diet] : a healthy diet [Regular Exercise] : regular exercise [Current] : risk screening reviewed and current [Reproductive Age] : is of reproductive age [Pregnancy History] : pregnancy history: [HPV Vaccine NA Due to Age] : HPV vaccine not available to patient due to age [Lower-Rt-Q] : lower right quadrant [Dull] : dull [Itching] : itching [Soreness] : soreness [Suprapubic] : suprapubic area [Vagina] : vagina [Undefiniable] : undefinable [Staining] : described as blood tinged staining of undergarments [Definite:  ___ (Date)] : the last menstrual period was [unfilled] [Normal Amount/Duration] : was of a normal amount and duration [Spotting Between  Menses] : spotting reported between menses [Regular Cycle Intervals] : periods have been regular [Monogamous] : is monogamous [NA] : N/A [Weight Concerns] : no concerns with her weight [Menstrual Problems] : reports normal menses [Up to Date] : not up to date with ~his/her~ STD screening [Burning] : no burning [Continuous] : no continuous [Sharp] : no sharp [Stabbing] : no stabbing [Mass] : no mass [Stinging] : no stinging [Lesion] : no lesion [Prolonged Menses] : menses of normal duration [Pain] : no pelvic pain [Dizziness] : no dizziness [Palpitations] : no palpitations [Menopausal Symptoms] : no manopausal symptoms [Pregnancy] : no pregnancy [FreeTextEntry9] : none [Hot Flashes] : no hot flashes [Night Sweats] : no night sweats [Vaginal Itching] : no vaginal itching [Dyspareunia] : no dyspareunia [Menstrual Cramps] : no menstrual cramps [On BCP at conception] : the patient was not on BCP at conception [Currently In Menopause] : not currently in menopause [Sexually Active] : is not sexually active [Contraception] : does not use contraception

## 2022-09-22 LAB — BACTERIA GENITAL AEROBE CULT: NORMAL

## 2022-12-23 ENCOUNTER — APPOINTMENT (OUTPATIENT)
Dept: MATERNAL FETAL MEDICINE | Facility: CLINIC | Age: 47
End: 2022-12-23

## 2022-12-23 ENCOUNTER — APPOINTMENT (OUTPATIENT)
Dept: ANTEPARTUM | Facility: CLINIC | Age: 47
End: 2022-12-23

## 2022-12-23 VITALS — HEIGHT: 66 IN | SYSTOLIC BLOOD PRESSURE: 110 MMHG | DIASTOLIC BLOOD PRESSURE: 80 MMHG | HEART RATE: 79 BPM

## 2022-12-23 DIAGNOSIS — N89.8 OTHER SPECIFIED NONINFLAMMATORY DISORDERS OF VAGINA: ICD-10-CM

## 2022-12-23 PROCEDURE — 99213 OFFICE O/P EST LOW 20 MIN: CPT

## 2022-12-23 RX ORDER — AMOXICILLIN 500 MG/1
500 CAPSULE ORAL
Qty: 24 | Refills: 0 | Status: DISCONTINUED | COMMUNITY
Start: 2022-06-16 | End: 2022-12-23

## 2022-12-23 NOTE — HISTORY OF PRESENT ILLNESS
[Burning] : burning [Itching] : no itching [Mass] : no mass [Discharge] : discharge [] : the vaginal discharge is described as [Mucous] : mucousy [Definite:  ___ (Date)] : the last menstrual period was [unfilled] [Normal Amount/Duration] : was of a normal amount and duration [Spotting Between  Menses] : no spotting between menses [Regular Cycle Intervals] : periods have been regular [Menstrual Cramps] : no menstrual cramps [Sexually Active] : is not sexually active [Monogamous] : is monogamous [Contraception] : does not use contraception [NA] : N/A

## 2022-12-23 NOTE — PHYSICAL EXAM
[Normal] : uterus [Atrophy] : no atrophy [Cystocele] : no cystocele [Scant] : scant [Foul Smelling] : not foul smelling [White] : white [Thick] : thick [No Bleeding] : there was no active vaginal bleeding [Discharge] : had no discharge [Barrel-Shaped] : was not barrel-shaped [Erosion] : had erosions [Bluish Discoloration] : did not have a bluish discoloration [Polyp ___ cm] : had no polyp [Mass ___ cm] : had no mass [Open ___ cm] : was closed [Tissue] : did not have tissue in it [Nabothian Cyst ___ cm] : had no nabothian cyst [Pap Obtained] : a Pap smear was not performed [IUD String] : did not have an IUD string protruding out [Dilated] : the cervix was not dilated [Motion Tenderness] : there was no cervical motion tenderness [Soft] :  the cervix was soft [Uterine Adnexae] : were not tender and not enlarged [FreeTextEntry5] : Cervix is hypervascular.  Spots with tactile stimulus.

## 2023-07-19 RX ORDER — TERCONAZOLE 4 MG/G
0.4 CREAM VAGINAL
Qty: 1 | Refills: 2 | Status: ACTIVE | COMMUNITY
Start: 2023-07-19 | End: 1900-01-01

## 2023-07-31 ENCOUNTER — APPOINTMENT (OUTPATIENT)
Dept: MATERNAL FETAL MEDICINE | Facility: CLINIC | Age: 48
End: 2023-07-31

## 2023-07-31 ENCOUNTER — APPOINTMENT (OUTPATIENT)
Dept: ANTEPARTUM | Facility: CLINIC | Age: 48
End: 2023-07-31

## 2023-08-01 ENCOUNTER — APPOINTMENT (OUTPATIENT)
Dept: OBGYN | Facility: CLINIC | Age: 48
End: 2023-08-01
Payer: COMMERCIAL

## 2023-08-01 PROCEDURE — 99396 PREV VISIT EST AGE 40-64: CPT

## 2023-09-21 ENCOUNTER — APPOINTMENT (OUTPATIENT)
Dept: OBGYN | Facility: CLINIC | Age: 48
End: 2023-09-21
Payer: COMMERCIAL

## 2023-09-21 PROCEDURE — 99213 OFFICE O/P EST LOW 20 MIN: CPT

## 2023-09-21 PROCEDURE — 36415 COLL VENOUS BLD VENIPUNCTURE: CPT

## 2023-09-21 PROCEDURE — 76830 TRANSVAGINAL US NON-OB: CPT

## 2023-09-29 ENCOUNTER — APPOINTMENT (OUTPATIENT)
Dept: ANTEPARTUM | Facility: CLINIC | Age: 48
End: 2023-09-29

## 2023-09-29 ENCOUNTER — APPOINTMENT (OUTPATIENT)
Dept: MATERNAL FETAL MEDICINE | Facility: CLINIC | Age: 48
End: 2023-09-29
Payer: COMMERCIAL

## 2023-09-29 ENCOUNTER — NON-APPOINTMENT (OUTPATIENT)
Age: 48
End: 2023-09-29

## 2023-09-29 VITALS — DIASTOLIC BLOOD PRESSURE: 70 MMHG | HEART RATE: 83 BPM | HEIGHT: 66 IN | SYSTOLIC BLOOD PRESSURE: 110 MMHG

## 2023-09-29 DIAGNOSIS — Z01.419 ENCOUNTER FOR GYNECOLOGICAL EXAMINATION (GENERAL) (ROUTINE) W/OUT ABNORMAL FINDINGS: ICD-10-CM

## 2023-09-29 DIAGNOSIS — N92.6 IRREGULAR MENSTRUATION, UNSPECIFIED: ICD-10-CM

## 2023-09-29 DIAGNOSIS — N94.9 UNSPECIFIED CONDITION ASSOCIATED WITH FEMALE GENITAL ORGANS AND MENSTRUAL CYCLE: ICD-10-CM

## 2023-09-29 DIAGNOSIS — E28.9 OVARIAN DYSFUNCTION, UNSPECIFIED: ICD-10-CM

## 2023-09-29 PROCEDURE — 99396 PREV VISIT EST AGE 40-64: CPT

## 2023-10-02 LAB
BACTERIA GENITAL AEROBE CULT: NORMAL
CANDIDA VAG CYTO: NOT DETECTED
G VAGINALIS+PREV SP MTYP VAG QL MICRO: NOT DETECTED
HPV HIGH+LOW RISK DNA PNL CVX: NOT DETECTED
T VAGINALIS VAG QL WET PREP: NOT DETECTED

## 2023-10-04 LAB — CYTOLOGY CVX/VAG DOC THIN PREP: NORMAL

## 2023-11-16 ENCOUNTER — TRANSCRIPTION ENCOUNTER (OUTPATIENT)
Age: 48
End: 2023-11-16

## 2023-12-01 ENCOUNTER — APPOINTMENT (OUTPATIENT)
Dept: MATERNAL FETAL MEDICINE | Facility: CLINIC | Age: 48
End: 2023-12-01
Payer: COMMERCIAL

## 2023-12-01 ENCOUNTER — APPOINTMENT (OUTPATIENT)
Dept: ANTEPARTUM | Facility: CLINIC | Age: 48
End: 2023-12-01

## 2023-12-01 VITALS
HEART RATE: 73 BPM | SYSTOLIC BLOOD PRESSURE: 96 MMHG | DIASTOLIC BLOOD PRESSURE: 60 MMHG | WEIGHT: 112 LBS | HEIGHT: 66 IN | BODY MASS INDEX: 18 KG/M2

## 2023-12-01 DIAGNOSIS — N92.3 OVULATION BLEEDING: ICD-10-CM

## 2023-12-01 DIAGNOSIS — N92.6 IRREGULAR MENSTRUATION, UNSPECIFIED: ICD-10-CM

## 2023-12-01 LAB
BILIRUBIN URINE: NORMAL
BLOOD URINE: NORMAL
GLUCOSE QUALITATIVE U: NORMAL
KETONES URINE: NORMAL
LEUKOCYTE ESTERASE URINE: NORMAL
NITRITE URINE: NORMAL
PROTEIN URINE: NORMAL

## 2023-12-01 PROCEDURE — 76857 US EXAM PELVIC LIMITED: CPT

## 2023-12-01 PROCEDURE — 58100 BIOPSY OF UTERUS LINING: CPT

## 2023-12-08 ENCOUNTER — NON-APPOINTMENT (OUTPATIENT)
Age: 48
End: 2023-12-08

## 2023-12-13 LAB — CORE LAB BIOPSY: NORMAL

## 2024-01-19 NOTE — END OF VISIT
Dr. Hernandez at bedside   [Time Spent: ___ minutes] : I have spent [unfilled] minutes of time on the encounter.

## 2024-04-12 ENCOUNTER — APPOINTMENT (OUTPATIENT)
Dept: ANTEPARTUM | Facility: CLINIC | Age: 49
End: 2024-04-12

## 2024-04-12 ENCOUNTER — APPOINTMENT (OUTPATIENT)
Dept: MATERNAL FETAL MEDICINE | Facility: CLINIC | Age: 49
End: 2024-04-12
Payer: COMMERCIAL

## 2024-04-12 VITALS
DIASTOLIC BLOOD PRESSURE: 70 MMHG | OXYGEN SATURATION: 98 % | HEIGHT: 66 IN | SYSTOLIC BLOOD PRESSURE: 110 MMHG | BODY MASS INDEX: 18 KG/M2 | HEART RATE: 75 BPM | WEIGHT: 112 LBS

## 2024-04-12 DIAGNOSIS — R10.2 PELVIC AND PERINEAL PAIN: ICD-10-CM

## 2024-04-12 DIAGNOSIS — N94.9 UNSPECIFIED CONDITION ASSOCIATED WITH FEMALE GENITAL ORGANS AND MENSTRUAL CYCLE: ICD-10-CM

## 2024-04-12 PROCEDURE — 76857 US EXAM PELVIC LIMITED: CPT

## 2024-04-12 PROCEDURE — 99214 OFFICE O/P EST MOD 30 MIN: CPT

## 2024-04-12 NOTE — HISTORY OF PRESENT ILLNESS
[Lower-Rt-Q] : lower right quadrant [Suprapubic] : suprapubic area [Lower-Lt-Q] : left lower quadrant [Continuous] : continuous [Dull] : no dull [Sharp] : sharp [Throbbing] : throbbing [4/10] : is 4/10 in severity [Diarrhea] : no diarrhea [Vaginal Discharge] : no vaginal discharge [Pelvic Pressure] : pelvic pressure [FreeTextEntry2] : urgency. [Burning] : burning [Itching] : itching [Definite:  ___ (Date)] : the last menstrual period was [unfilled] [Regular Cycle Intervals] : periods have been regular [Sexually Active] : is not sexually active [Monogamous] : is monogamous [Contraception] : does not use contraception [NA] : N/A

## 2024-04-12 NOTE — CHIEF COMPLAINT
[Urgent Visit] : Urgent Visit [FreeTextEntry1] : vaginal burning sensation. also pelvic pain. Possible ruptured ovarian cyst.

## 2024-04-12 NOTE — PROCEDURE
[Pelvic Pain] : pelvic pain [Transvaginal Ultrasound] : transvaginal ultrasound [Anteverted] : anteverted [No Fibroid(s)] : no fibroid(s) [L: ___ cm] : L: [unfilled] cm [W: ___cm] : W: [unfilled] cm [H: ___ cm] : H: [unfilled] cm [FreeTextEntry5] : EM 11mm [FreeTextEntry7] : 3.9 x 2.0 x 2.9 [FreeTextEntry8] : 2.6 x 1.7 x 3.0

## 2024-04-16 LAB — BACTERIA GENITAL AEROBE CULT: NORMAL

## 2024-07-12 ENCOUNTER — APPOINTMENT (OUTPATIENT)
Dept: ANTEPARTUM | Facility: CLINIC | Age: 49
End: 2024-07-12

## 2024-07-12 ENCOUNTER — APPOINTMENT (OUTPATIENT)
Dept: MATERNAL FETAL MEDICINE | Facility: CLINIC | Age: 49
End: 2024-07-12
Payer: COMMERCIAL

## 2024-07-12 VITALS
OXYGEN SATURATION: 98 % | BODY MASS INDEX: 18.64 KG/M2 | HEART RATE: 76 BPM | WEIGHT: 116 LBS | SYSTOLIC BLOOD PRESSURE: 110 MMHG | DIASTOLIC BLOOD PRESSURE: 72 MMHG | HEIGHT: 66 IN

## 2024-07-12 DIAGNOSIS — N94.9 UNSPECIFIED CONDITION ASSOCIATED WITH FEMALE GENITAL ORGANS AND MENSTRUAL CYCLE: ICD-10-CM

## 2024-07-12 PROCEDURE — 99213 OFFICE O/P EST LOW 20 MIN: CPT

## 2024-07-12 RX ORDER — MOMETASONE FUROATE 1 MG/G
0.1 CREAM TOPICAL TWICE DAILY
Qty: 1 | Refills: 10 | Status: ACTIVE | COMMUNITY
Start: 2024-07-12 | End: 1900-01-01

## 2024-07-15 LAB
BACTERIA GENITAL AEROBE CULT: NORMAL
BILIRUBIN URINE: NORMAL
BLOOD URINE: NORMAL
GLUCOSE QUALITATIVE U: NORMAL
KETONES URINE: NORMAL
LEUKOCYTE ESTERASE URINE: NORMAL
NITRITE URINE: NORMAL
PROTEIN URINE: NORMAL

## 2024-08-12 ENCOUNTER — APPOINTMENT (OUTPATIENT)
Dept: OBGYN | Facility: CLINIC | Age: 49
End: 2024-08-12
Payer: COMMERCIAL

## 2024-08-12 ENCOUNTER — RESULT REVIEW (OUTPATIENT)
Age: 49
End: 2024-08-12

## 2024-08-12 PROCEDURE — 99396 PREV VISIT EST AGE 40-64: CPT

## 2024-08-12 PROCEDURE — 36415 COLL VENOUS BLD VENIPUNCTURE: CPT

## 2024-08-12 PROCEDURE — 96127 BRIEF EMOTIONAL/BEHAV ASSMT: CPT

## 2024-08-19 ENCOUNTER — APPOINTMENT (OUTPATIENT)
Dept: ORTHOPEDIC SURGERY | Facility: CLINIC | Age: 49
End: 2024-08-19

## 2024-09-09 ENCOUNTER — APPOINTMENT (OUTPATIENT)
Dept: ORTHOPEDIC SURGERY | Facility: CLINIC | Age: 49
End: 2024-09-09

## 2024-09-09 VITALS — BODY MASS INDEX: 18.64 KG/M2 | HEIGHT: 66 IN | WEIGHT: 116 LBS

## 2024-09-09 DIAGNOSIS — M25.851 OTHER SPECIFIED JOINT DISORDERS, RIGHT HIP: ICD-10-CM

## 2024-09-09 DIAGNOSIS — M70.62 TROCHANTERIC BURSITIS, LEFT HIP: ICD-10-CM

## 2024-09-09 DIAGNOSIS — M25.852 OTHER SPECIFIED JOINT DISORDERS, LEFT HIP: ICD-10-CM

## 2024-09-09 PROCEDURE — 73522 X-RAY EXAM HIPS BI 3-4 VIEWS: CPT

## 2024-09-09 PROCEDURE — 99203 OFFICE O/P NEW LOW 30 MIN: CPT | Mod: 25

## 2024-09-09 PROCEDURE — 72100 X-RAY EXAM L-S SPINE 2/3 VWS: CPT

## 2024-09-09 PROCEDURE — J3490M: CUSTOM

## 2024-09-09 PROCEDURE — 20611 DRAIN/INJ JOINT/BURSA W/US: CPT | Mod: 50

## 2024-09-09 NOTE — HISTORY OF PRESENT ILLNESS
[] : yes [Constant] : constant [Household chores] : household chores [Nothing helps with pain getting better] : Nothing helps with pain getting better [Part time] : Work status: part time [de-identified] : 09/09/2024: patient is here today for left hip pain, pain started from years ago but gotten wrose over time. pt states when sitting in the car, she has pain going into b/l groin. pt also states she does have lumbar and cervical pain. [FreeTextEntry1] : L HIP  [FreeTextEntry7] : from b/l hip down into b/l leghs. no numbness or tingling [de-identified] : sitting position for a long period of time  [de-identified] :

## 2024-09-09 NOTE — DISCUSSION/SUMMARY
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

## 2024-09-09 NOTE — PROCEDURE
[FreeTextEntry1] : R hip IA US CSI, L hip US GT CSI [FreeTextEntry2] : pain [FreeTextEntry3] :  The patient was positioned supine on the exam table with the right anterior hip exposed. All risks and benefits were discussed with the patient including infection, bleeding, allergic reactions, worsening of symptoms, and possible neurovascular damage.  They stated understanding and were agreeable to proceed.   The skin overlying the injection site was sterilely prepped and ultrasound was used to localize the hip joint.  A 22 gauge spinal needle was inserted into the hip joint which was confirmed with ultrasound.  40mg Kenalog and 4cc of 0.5% Marcaine was then injected into the hip joint with confirmed ballottement of the hip capsule.  The needle was withdrawn and a band aid was placed.  The patient tolerated the procedure well without any apparent complications.    The patient was then positioned supine on the exam table with the L lateral hip exposed. All risks and benefits were discussed with the patient including infection, bleeding, allergic reactions, worsening of symptoms, and possible neurovascular damage.  They stated understanding and were agreeable to proceed.   The skin overlying the injection site was sterilely prepped with ETOH and betadine.  The greater trochanter was localized with the ultrasound probe and the point of maximal tenderness was identified by applying gentle pressure through the probe.  A 20 gauge needle was inserted into the point of maximum tenderness along the lateral hip.  40mg Kenalog and 4cc of 0.5% Marcaine was then injected.  The needle was withdrawn and a band aid was placed.  The patient tolerated the procedure well without any apparent complications.  They were instructed to perform activities that would normally aggravate their hip and to take note of how much relief they get from the injection.  Counseled on concerning signs and symptoms including redness/swelling and to call the office with any problems.

## 2024-09-09 NOTE — IMAGING
[Disc space narrowing] : Disc space narrowing [Bilateral] : hip with pelvis bilaterally [de-identified] : General: NAD, A&Ox3 Gait: Non-antalgic, no Trendelenburg Foot Progression: neutral Knee Progression: neutral   R Hip Exam: Pain with Log Roll - negative Flexion: 110 Pain with Hyperflexion - yes FADIR - pos JOAQUINA - neg Extension: 20 Flexion Contracture - none Prone Apprehension Test - neg Prone Rotation Test - symmetric Ischial tenderness - none Trochanteric tenderness - none   Abduction - 4 /5, pain - yes Adduction - 5 /5 Supine resisted SLR - 5 /5, pain - no Seated resisted hip flexion - 5 /5, pain - no Dorsiflexion 5/5 Plantarflexion 5/5 EHL 5/5 DP/PT 2+, foot is warm and well perfused  L Hip Exam: Pain with Log Roll - negative Flexion: 110 Pain with Hyperflexion - yes FADIR - neg JOAQUINA - neg Extension: 20 Flexion Contracture - none Prone Apprehension Test - neg Prone Rotation Test - symmetric Ischial tenderness - none Trochanteric tenderness - pos   Abduction - 4 /5, pain - yes Adduction - 5 /5 Supine resisted SLR - 5 /5, pain - no Seated resisted hip flexion - 5 /5, pain - no Dorsiflexion 5/5 Plantarflexion 5/5 EHL 5/5 DP/PT 2+, foot is warm and well perfused      Leg Lengths: symmetric    [FreeTextEntry9] : acetabular retroversion on R, cam deformity with alpha >50 bilaterally

## 2024-09-11 ENCOUNTER — APPOINTMENT (OUTPATIENT)
Dept: OBGYN | Facility: CLINIC | Age: 49
End: 2024-09-11

## 2024-09-30 ENCOUNTER — APPOINTMENT (OUTPATIENT)
Dept: OBGYN | Facility: CLINIC | Age: 49
End: 2024-09-30
Payer: COMMERCIAL

## 2024-09-30 PROCEDURE — 76830 TRANSVAGINAL US NON-OB: CPT

## 2024-10-08 ENCOUNTER — APPOINTMENT (OUTPATIENT)
Dept: OBGYN | Facility: CLINIC | Age: 49
End: 2024-10-08
Payer: COMMERCIAL

## 2024-10-08 PROCEDURE — 81025 URINE PREGNANCY TEST: CPT

## 2024-10-08 PROCEDURE — 58100 BIOPSY OF UTERUS LINING: CPT

## 2024-10-18 ENCOUNTER — APPOINTMENT (OUTPATIENT)
Dept: ORTHOPEDIC SURGERY | Facility: CLINIC | Age: 49
End: 2024-10-18
Payer: COMMERCIAL

## 2024-10-18 VITALS — WEIGHT: 116 LBS | HEIGHT: 66 IN | BODY MASS INDEX: 18.64 KG/M2

## 2024-10-18 DIAGNOSIS — S40.011S CONTUSION OF RIGHT SHOULDER, SEQUELA: ICD-10-CM

## 2024-10-18 DIAGNOSIS — S70.01XS CONTUSION OF RIGHT HIP, SEQUELA: ICD-10-CM

## 2024-10-18 PROCEDURE — 73010 X-RAY EXAM OF SHOULDER BLADE: CPT | Mod: RT

## 2024-10-18 PROCEDURE — 73503 X-RAY EXAM HIP UNI 4/> VIEWS: CPT | Mod: RT

## 2024-10-18 PROCEDURE — 99212 OFFICE O/P EST SF 10 MIN: CPT | Mod: 25

## 2024-10-18 PROCEDURE — 73030 X-RAY EXAM OF SHOULDER: CPT | Mod: RT

## 2024-10-29 ENCOUNTER — APPOINTMENT (OUTPATIENT)
Dept: ORTHOPEDIC SURGERY | Facility: CLINIC | Age: 49
End: 2024-10-29
Payer: COMMERCIAL

## 2024-10-29 VITALS — HEIGHT: 66 IN | BODY MASS INDEX: 18.64 KG/M2 | WEIGHT: 116 LBS

## 2024-10-29 DIAGNOSIS — M25.852 OTHER SPECIFIED JOINT DISORDERS, LEFT HIP: ICD-10-CM

## 2024-10-29 DIAGNOSIS — S70.01XS CONTUSION OF RIGHT HIP, SEQUELA: ICD-10-CM

## 2024-10-29 DIAGNOSIS — M25.851 OTHER SPECIFIED JOINT DISORDERS, RIGHT HIP: ICD-10-CM

## 2024-10-29 DIAGNOSIS — M47.812 SPONDYLOSIS W/OUT MYELOPATHY OR RADICULOPATHY, CERVICAL REGION: ICD-10-CM

## 2024-10-29 PROCEDURE — 99213 OFFICE O/P EST LOW 20 MIN: CPT

## 2024-11-12 ENCOUNTER — APPOINTMENT (OUTPATIENT)
Dept: ORTHOPEDIC SURGERY | Facility: CLINIC | Age: 49
End: 2024-11-12

## 2024-11-12 DIAGNOSIS — S70.01XS CONTUSION OF RIGHT HIP, SEQUELA: ICD-10-CM

## 2024-11-12 PROCEDURE — 99213 OFFICE O/P EST LOW 20 MIN: CPT

## 2024-11-12 PROCEDURE — 20611 DRAIN/INJ JOINT/BURSA W/US: CPT | Mod: RT

## 2025-01-22 ENCOUNTER — APPOINTMENT (OUTPATIENT)
Dept: OBGYN | Facility: CLINIC | Age: 50
End: 2025-01-22
Payer: COMMERCIAL

## 2025-01-22 PROCEDURE — 99459 PELVIC EXAMINATION: CPT

## 2025-01-22 PROCEDURE — 99213 OFFICE O/P EST LOW 20 MIN: CPT

## 2025-03-18 ENCOUNTER — APPOINTMENT (OUTPATIENT)
Facility: CLINIC | Age: 50
End: 2025-03-18
Payer: COMMERCIAL

## 2025-03-18 VITALS — HEIGHT: 66 IN | WEIGHT: 116 LBS | BODY MASS INDEX: 18.64 KG/M2

## 2025-03-18 DIAGNOSIS — M89.8X8 OTHER SPECIFIED DISORDERS OF BONE, OTHER SITE: ICD-10-CM

## 2025-03-18 DIAGNOSIS — M54.50 LOW BACK PAIN, UNSPECIFIED: ICD-10-CM

## 2025-03-18 DIAGNOSIS — R10.2 PELVIC AND PERINEAL PAIN: ICD-10-CM

## 2025-03-18 PROCEDURE — 72170 X-RAY EXAM OF PELVIS: CPT

## 2025-03-18 PROCEDURE — J3490M: CUSTOM

## 2025-03-18 PROCEDURE — 72100 X-RAY EXAM L-S SPINE 2/3 VWS: CPT

## 2025-03-18 PROCEDURE — 99213 OFFICE O/P EST LOW 20 MIN: CPT | Mod: 25

## 2025-03-18 PROCEDURE — 20552 NJX 1/MLT TRIGGER POINT 1/2: CPT

## 2025-04-29 ENCOUNTER — APPOINTMENT (OUTPATIENT)
Dept: OBGYN | Facility: CLINIC | Age: 50
End: 2025-04-29

## 2025-05-05 ENCOUNTER — APPOINTMENT (OUTPATIENT)
Dept: OBGYN | Facility: CLINIC | Age: 50
End: 2025-05-05
Payer: COMMERCIAL

## 2025-05-05 PROCEDURE — 99213 OFFICE O/P EST LOW 20 MIN: CPT

## 2025-05-05 PROCEDURE — 99459 PELVIC EXAMINATION: CPT

## 2025-06-04 ENCOUNTER — APPOINTMENT (OUTPATIENT)
Dept: OBGYN | Facility: CLINIC | Age: 50
End: 2025-06-04
Payer: COMMERCIAL

## 2025-06-04 PROCEDURE — 99213 OFFICE O/P EST LOW 20 MIN: CPT

## 2025-06-10 ENCOUNTER — APPOINTMENT (OUTPATIENT)
Dept: UROLOGY | Facility: CLINIC | Age: 50
End: 2025-06-10
Payer: COMMERCIAL

## 2025-06-10 VITALS
WEIGHT: 110 LBS | BODY MASS INDEX: 17.68 KG/M2 | SYSTOLIC BLOOD PRESSURE: 109 MMHG | HEIGHT: 66 IN | OXYGEN SATURATION: 100 % | DIASTOLIC BLOOD PRESSURE: 67 MMHG | HEART RATE: 85 BPM

## 2025-06-10 PROBLEM — R31.0 INTERMITTENT GROSS HEMATURIA: Status: ACTIVE | Noted: 2025-06-10

## 2025-06-10 PROCEDURE — 51798 US URINE CAPACITY MEASURE: CPT

## 2025-06-10 PROCEDURE — 99459 PELVIC EXAMINATION: CPT

## 2025-06-10 PROCEDURE — 99204 OFFICE O/P NEW MOD 45 MIN: CPT | Mod: 25

## 2025-06-10 RX ORDER — HYDROXYUREA 500 MG/1
CAPSULE ORAL
Refills: 0 | Status: ACTIVE | COMMUNITY

## 2025-06-10 RX ORDER — ESCITALOPRAM OXALATE 5 MG/1
TABLET, FILM COATED ORAL
Refills: 0 | Status: ACTIVE | COMMUNITY

## 2025-06-11 ENCOUNTER — APPOINTMENT (OUTPATIENT)
Dept: OBGYN | Facility: CLINIC | Age: 50
End: 2025-06-11
Payer: COMMERCIAL

## 2025-06-11 PROCEDURE — 76830 TRANSVAGINAL US NON-OB: CPT

## 2025-06-11 PROCEDURE — 36415 COLL VENOUS BLD VENIPUNCTURE: CPT

## 2025-06-11 PROCEDURE — 99213 OFFICE O/P EST LOW 20 MIN: CPT | Mod: 25

## 2025-06-12 LAB
APPEARANCE: CLEAR
BILIRUBIN URINE: NEGATIVE
BLOOD URINE: NEGATIVE
COLOR: YELLOW
GLUCOSE QUALITATIVE U: NEGATIVE MG/DL
KETONES URINE: NEGATIVE MG/DL
LEUKOCYTE ESTERASE URINE: NEGATIVE
NITRITE URINE: NEGATIVE
PH URINE: 6.5
PROTEIN URINE: NEGATIVE MG/DL
SPECIFIC GRAVITY URINE: <1.005
UROBILINOGEN URINE: 0.2 MG/DL

## 2025-06-13 LAB — URINE CYTOLOGY: NORMAL

## 2025-06-17 PROBLEM — D17.71 ANGIOMYOLIPOMA OF KIDNEY: Status: ACTIVE | Noted: 2025-06-17

## 2025-07-07 ENCOUNTER — APPOINTMENT (OUTPATIENT)
Dept: UROLOGY | Facility: CLINIC | Age: 50
End: 2025-07-07

## 2025-07-09 ENCOUNTER — APPOINTMENT (OUTPATIENT)
Dept: UROLOGY | Facility: CLINIC | Age: 50
End: 2025-07-09
Payer: COMMERCIAL

## 2025-07-09 VITALS
OXYGEN SATURATION: 100 % | DIASTOLIC BLOOD PRESSURE: 65 MMHG | HEART RATE: 79 BPM | RESPIRATION RATE: 16 BRPM | WEIGHT: 110 LBS | SYSTOLIC BLOOD PRESSURE: 110 MMHG | HEIGHT: 66 IN | BODY MASS INDEX: 17.68 KG/M2 | TEMPERATURE: 97.7 F

## 2025-07-09 LAB
BILIRUB UR QL STRIP: NEGATIVE
CLARITY UR: CLEAR
COLLECTION METHOD: NORMAL
GLUCOSE UR-MCNC: NEGATIVE
HCG UR QL: 0.2 EU/DL
HGB UR QL STRIP.AUTO: NEGATIVE
KETONES UR-MCNC: NEGATIVE
LEUKOCYTE ESTERASE UR QL STRIP: NEGATIVE
NITRITE UR QL STRIP: NEGATIVE
PH UR STRIP: 7
PROT UR STRIP-MCNC: NEGATIVE
SP GR UR STRIP: 1.01

## 2025-07-09 PROCEDURE — 51798 US URINE CAPACITY MEASURE: CPT

## 2025-07-09 PROCEDURE — 52000 CYSTOURETHROSCOPY: CPT

## 2025-07-09 PROCEDURE — 81003 URINALYSIS AUTO W/O SCOPE: CPT | Mod: QW

## 2025-07-09 PROCEDURE — 99214 OFFICE O/P EST MOD 30 MIN: CPT | Mod: 25

## 2025-07-09 RX ORDER — SULFAMETHOXAZOLE AND TRIMETHOPRIM 800; 160 MG/1; MG/1
800-160 TABLET ORAL
Refills: 0 | Status: COMPLETED | OUTPATIENT
Start: 2025-07-09

## 2025-07-09 RX ORDER — SULFAMETHOXAZOLE AND TRIMETHOPRIM 800; 160 MG/1; MG/1
800-160 TABLET ORAL
Qty: 1 | Refills: 0 | Status: COMPLETED | OUTPATIENT
Start: 2025-07-09 | End: 2025-07-10

## 2025-07-09 RX ADMIN — SULFAMETHOXAZOLE AND TRIMETHOPRIM 0 MG: 800; 160 TABLET ORAL at 00:00

## 2025-07-10 LAB
APPEARANCE: CLEAR
BILIRUBIN URINE: NEGATIVE
BLOOD URINE: NEGATIVE
COLOR: YELLOW
GLUCOSE QUALITATIVE U: NEGATIVE MG/DL
KETONES URINE: NEGATIVE MG/DL
LEUKOCYTE ESTERASE URINE: NEGATIVE
NITRITE URINE: NEGATIVE
PH URINE: 8
PROTEIN URINE: NEGATIVE MG/DL
SPECIFIC GRAVITY URINE: 1.01
UROBILINOGEN URINE: 0.2 MG/DL

## 2025-07-21 ENCOUNTER — APPOINTMENT (OUTPATIENT)
Dept: OBGYN | Facility: CLINIC | Age: 50
End: 2025-07-21
Payer: COMMERCIAL

## 2025-07-21 PROCEDURE — 76830 TRANSVAGINAL US NON-OB: CPT

## 2025-07-23 ENCOUNTER — APPOINTMENT (OUTPATIENT)
Dept: UROLOGY | Facility: CLINIC | Age: 50
End: 2025-07-23